# Patient Record
Sex: FEMALE | Race: WHITE
[De-identification: names, ages, dates, MRNs, and addresses within clinical notes are randomized per-mention and may not be internally consistent; named-entity substitution may affect disease eponyms.]

---

## 2017-10-16 ENCOUNTER — HOSPITAL ENCOUNTER (EMERGENCY)
Dept: HOSPITAL 11 - JP.ED | Age: 42
Discharge: HOME | End: 2017-10-16
Payer: MEDICAID

## 2017-10-16 VITALS — SYSTOLIC BLOOD PRESSURE: 138 MMHG | DIASTOLIC BLOOD PRESSURE: 87 MMHG

## 2017-10-16 DIAGNOSIS — F31.9: ICD-10-CM

## 2017-10-16 DIAGNOSIS — Z88.0: ICD-10-CM

## 2017-10-16 DIAGNOSIS — F17.210: ICD-10-CM

## 2017-10-16 DIAGNOSIS — F41.9: Primary | ICD-10-CM

## 2017-10-16 DIAGNOSIS — J45.909: ICD-10-CM

## 2017-10-16 DIAGNOSIS — R21: ICD-10-CM

## 2017-10-16 DIAGNOSIS — Z79.899: ICD-10-CM

## 2017-10-16 NOTE — EDM.PDOC
ED HPI GENERAL MEDICAL PROBLEM





- General


Stated Complaint: MEDICAL VIA NORTH


Time Seen by Provider: 10/16/17 07:10


Source of Information: Reports: Patient, EMS





- History of Present Illness


INITIAL COMMENTS - FREE TEXT/NARRATIVE: 





42-year-old female brought in by ambulance in an acutely anxious state thinking 

she is dehydrated, septic, and several other complaints that are really not 

realistic. She is on several psychiatric medications, her alprazolam and 

Vyvanse both appear to be empty despite being 1 week to 2 weeks before they 

were supposed to be gone. She has some superficial abrasions on her face.


Onset: Unknown/Unsure


Severity: Mild


Associated Symptoms: Reports: Loss of Appetite, Malaise.  Denies: Fever/Chills, 

Nausea/Vomiting, Shortness of Breath


  ** Feet


Pain Score (Numeric/FACES): 6





- Related Data


 Allergies











Allergy/AdvReac Type Severity Reaction Status Date / Time


 


Penicillins Allergy  Hives Verified 10/16/17 07:25











Home Meds: 


 Home Meds





LORazepam 1.5 tab PO TID PRN 06/27/15 [History]


Temazepam 30 mg PO BEDTIME PRN 06/27/15 [History]


tiZANidine [Zanaflex] 4 mg PO QID PRN 08/29/15 [History]


Gabapentin [Neurontin] 1 tab PO TID 10/16/17 [History]


Lisdexamfetamine [Vyvanse] 1 cap PO DAILY 10/16/17 [History]


Lurasidone HCl [Latuda] 20 mg PO DAILY 10/16/17 [History]


Melatonin/Pyridoxine HCl (B6) [Melatonin 3 mg Tablet] 1 tab PO ASDIRECTED 10/16/

17 [History]


Naproxen 1 tab PO BID 10/16/17 [History]


Garcia/Polymyx B Sulf/Dexameth [Ifgekr-Nhhfu-Dyjusrvb Eye Drop] 1 drop EYEBOTH 

ASDIRECTED 10/16/17 [History]


SUMAtriptan [Imitrex] 1 tab PO ASDIRECTED 10/16/17 [History]


Vilazodone [Viibryd] 1 tab PO DAILY 10/16/17 [History]


metroNIDAZOLE [Metronidazole] 1 tab PO BID 10/16/17 [History]











Past Medical History


HEENT History: Reports: Impaired Vision


Respiratory History: Reports: Asthma, Bronchitis, Recurrent, Pneumonia, 

Recurrent


Gastrointestinal History: Reports: Chronic Diarrhea, Irritable Bowel Syndrome


OB/GYN History: Reports: Pregnancy


Other OB/BYN History: states had "internal and external" ultrasound 2 months ago

, found thickening of uterine wall


Musculoskeletal History: Reports: Arthritis, Back Pain, Chronic, Fibromyalgia


Neurological History: Reports: Migraines


Psychiatric History: Reports: Anxiety, Bipolar, Depression, Psych 

Hospitalization(s), Suicide Attempt


Endocrine/Metabolic History: Reports: Obesity/BMI 30+





- Infectious Disease History


Infectious Disease History: Reports: Chicken Pox





- Past Surgical History


Respiratory Surgical History: Reports: Other (See Below)


Female  Surgical History: Reports:  Section, Other (See Below)





Social & Family History





- Tobacco Use


Smoking Status *Q: Light Tobacco Smoker


Years of Tobacco use: 25


Packs/Tins Daily: 0.5


Used Tobacco, but Quit: No


Month Tobacco Last Used: may


Second Hand Smoke Exposure: Yes





- Alcohol Use


Days Per Week of Alcohol Use: 1


Number of Drinks Per Day: 2


Total Drinks Per Week: 2





- Recreational Drug Use


Recreational Drug Use: No


Drug Use in Last 12 Months: Yes


Recreational Drug Type: Reports: Marijuana/Hashish


Recreational Drug Use Frequency: Monthly


Recreational Drug Last Use: 1 week





- Living Situation & Occupation


Living situation: Reports: Single


Occupation: Employed





ED ROS GENERAL





- Review of Systems


Review Of Systems: See Below


Constitutional: Reports: Malaise.  Denies: Fever, Chills


Respiratory: Denies: Shortness of Breath


Cardiovascular: Denies: Chest Pain


GI/Abdominal: Reports: Decreased Appetite, Nausea


: Reports: No Symptoms


Musculoskeletal: Reports: Other (Complains of toe pain)


Skin: Reports: Other (Superficial excoriations and abrasions on her face)





ED EXAM, GENERAL





- Physical Exam


Exam: See Below


Exam Limited By: No Limitations


General Appearance: Alert, Anxious


Eye Exam: Bilateral Eye: Normal Inspection


Respiratory/Chest: No Respiratory Distress, Lungs Clear


Cardiovascular: Regular Rate, Rhythm


GI/Abdominal: Non-Tender


Extremities: Other (Exam of the lower extremities is normal including the feet)


Neurological: Alert


Psychiatric: Anxious


Skin Exam: Warm, Dry, Other (Patient does have several superficial erythematous 

abrasions on the forehead and face)





Course





- Vital Signs


Last Recorded V/S: 


 Last Vital Signs











Temp  98.2 F   10/16/17 07:00


 


Pulse  66   10/16/17 07:30


 


Resp  16   10/16/17 07:30


 


BP  138/87   10/16/17 07:30


 


Pulse Ox  96   10/16/17 07:30














- Orders/Labs/Meds


Labs: 


 Laboratory Tests











  10/16/17 10/16/17 10/16/17 Range/Units





  07:35 07:35 08:04 


 


WBC  8.3    (4.5-11.0)  K/uL


 


RBC  4.23    (3.30-5.50)  M/uL


 


Hgb  12.5    (12.0-15.0)  g/dL


 


Hct  38.2    (36.0-48.0)  %


 


MCV  90    (80-98)  fL


 


MCH  30    (27-31)  pg


 


MCHC  33    (32-36)  %


 


Plt Count  281    (150-400)  K/uL


 


Neut % (Auto)  63    (36-66)  %


 


Lymph % (Auto)  26    (24-44)  %


 


Mono % (Auto)  7 H    (2-6)  %


 


Eos % (Auto)  4    (2-4)  %


 


Baso % (Auto)  0    (0-1)  %


 


Sodium   145   (140-148)  mmol/L


 


Potassium   3.4 L   (3.6-5.2)  mmol/L


 


Chloride   110 H   (100-108)  mmol/L


 


Carbon Dioxide   26   (21-32)  mmol/L


 


Anion Gap   12.4   (5.0-14.0)  mmol/L


 


BUN   11   (7-18)  mg/dL


 


Creatinine   0.7   (0.6-1.0)  mg/dL


 


Est Cr Clr Drug Dosing   97.01   mL/min


 


Estimated GFR (MDRD)   > 60   (>60)  


 


Glucose   94   ()  mg/dL


 


Calcium   8.1 L   (8.5-10.1)  mg/dL


 


Urine Color     


 


Urine Appearance     


 


Urine pH     (4.5-8.0)  


 


Ur Specific Gravity     (1.008-1.030)  


 


Urine Protein     (NEGATIVE)  mg/dL


 


Urine Glucose (UA)     (NEGATIVE)  mg/dL


 


Urine Ketones     (NEGATIVE)  mg/dL


 


Urine Occult Blood     (NEGATIVE)  


 


Urine Nitrite     (NEGATIVE)  


 


Urine Bilirubin     (NEGATIVE)  


 


Urine Urobilinogen     (NORMAL)  mg/dL


 


Ur Leukocyte Esterase     (NEGATIVE)  


 


Urine RBC     (0-5)  


 


Urine WBC     (0-5)  


 


Ur Epithelial Cells     


 


Amorphous Sediment     


 


Urine Bacteria     


 


Urine Mucus     


 


Urine Opiates Screen    Negative  (NEGATIVE)  


 


Ur Oxycodone Screen    Negative  (NEGATIVE)  


 


Urine Methadone Screen    Negative  (NEGATIVE)  


 


Ur Propoxyphene Screen    Negative  (NEGATIVE)  


 


Ur Barbiturates Screen    Negative  (NEGATIVE)  


 


Ur Tricyclics Screen    Negative  (NEGATIVE)  


 


Ur Phencyclidine Scrn    Negative  (NEGATIVE)  


 


Ur Amphetamine Screen    Positive H  (NEGATIVE)  


 


U Methamphetamines Scrn    Negative  (NEGATIVE)  


 


Urine MDMA Screen    Negative  (NEGATIVE)  


 


U Benzodiazepines Scrn    Positive H  (NEGATIVE)  


 


U Cocaine Metab Screen    Negative  (NEGATIVE)  


 


U Marijuana (THC) Screen    Negative  (NEGATIVE)  














  10/16/17 Range/Units





  08:04 


 


WBC   (4.5-11.0)  K/uL


 


RBC   (3.30-5.50)  M/uL


 


Hgb   (12.0-15.0)  g/dL


 


Hct   (36.0-48.0)  %


 


MCV   (80-98)  fL


 


MCH   (27-31)  pg


 


MCHC   (32-36)  %


 


Plt Count   (150-400)  K/uL


 


Neut % (Auto)   (36-66)  %


 


Lymph % (Auto)   (24-44)  %


 


Mono % (Auto)   (2-6)  %


 


Eos % (Auto)   (2-4)  %


 


Baso % (Auto)   (0-1)  %


 


Sodium   (140-148)  mmol/L


 


Potassium   (3.6-5.2)  mmol/L


 


Chloride   (100-108)  mmol/L


 


Carbon Dioxide   (21-32)  mmol/L


 


Anion Gap   (5.0-14.0)  mmol/L


 


BUN   (7-18)  mg/dL


 


Creatinine   (0.6-1.0)  mg/dL


 


Est Cr Clr Drug Dosing   mL/min


 


Estimated GFR (MDRD)   (>60)  


 


Glucose   ()  mg/dL


 


Calcium   (8.5-10.1)  mg/dL


 


Urine Color  Yellow  


 


Urine Appearance  Clear  


 


Urine pH  7.0  (4.5-8.0)  


 


Ur Specific Gravity  1.015  (1.008-1.030)  


 


Urine Protein  Negative  (NEGATIVE)  mg/dL


 


Urine Glucose (UA)  Normal  (NEGATIVE)  mg/dL


 


Urine Ketones  Negative  (NEGATIVE)  mg/dL


 


Urine Occult Blood  Negative  (NEGATIVE)  


 


Urine Nitrite  Negative  (NEGATIVE)  


 


Urine Bilirubin  Negative  (NEGATIVE)  


 


Urine Urobilinogen  Normal  (NORMAL)  mg/dL


 


Ur Leukocyte Esterase  Negative  (NEGATIVE)  


 


Urine RBC  0-5  (0-5)  


 


Urine WBC  0-5  (0-5)  


 


Ur Epithelial Cells  Few  


 


Amorphous Sediment  Not seen  


 


Urine Bacteria  Few  


 


Urine Mucus  Not seen  


 


Urine Opiates Screen   (NEGATIVE)  


 


Ur Oxycodone Screen   (NEGATIVE)  


 


Urine Methadone Screen   (NEGATIVE)  


 


Ur Propoxyphene Screen   (NEGATIVE)  


 


Ur Barbiturates Screen   (NEGATIVE)  


 


Ur Tricyclics Screen   (NEGATIVE)  


 


Ur Phencyclidine Scrn   (NEGATIVE)  


 


Ur Amphetamine Screen   (NEGATIVE)  


 


U Methamphetamines Scrn   (NEGATIVE)  


 


Urine MDMA Screen   (NEGATIVE)  


 


U Benzodiazepines Scrn   (NEGATIVE)  


 


U Cocaine Metab Screen   (NEGATIVE)  


 


U Marijuana (THC) Screen   (NEGATIVE)  














- Re-Assessments/Exams


Free Text/Narrative Re-Assessment/Exam: 





10/16/17 08:46


CBC and CMP were obtained which are generally normal. Urine tox screen was 

positive for amphetamines and benzodiazepines which are prescribed to the 

patient. When I asked her why she was out of her medications early she said 

that her "cat is knocking them over on her table". She needs to schedule an 

appointment in the near future to discuss her medications with her primary care 

physician. I reassured her that she is not significantly physically ill.





Departure





- Departure


Time of Disposition: 09:30


Disposition: Home, Self-Care 01


Condition: Good


Clinical Impression: 


 Anxiety about health, Rash of face








- Discharge Information


Instructions:  Panic Attacks, Easy-to-Read


Referrals: 


PCP,None [Primary Care Provider] - 


Forms:  ED Department Discharge


Care Plan Goals: 


See your primary provider as soon as possible to discuss your medications. 

Continue to stay hydrated.

## 2017-10-19 ENCOUNTER — HOSPITAL ENCOUNTER (EMERGENCY)
Dept: HOSPITAL 11 - JP.ED | Age: 42
Discharge: LEFT BEFORE DISCHARGE | End: 2017-10-19
Payer: MEDICAID

## 2017-10-19 VITALS — SYSTOLIC BLOOD PRESSURE: 135 MMHG | DIASTOLIC BLOOD PRESSURE: 84 MMHG

## 2017-10-19 DIAGNOSIS — T50.905A: ICD-10-CM

## 2017-10-19 DIAGNOSIS — F17.210: ICD-10-CM

## 2017-10-19 DIAGNOSIS — Z88.0: ICD-10-CM

## 2017-10-19 DIAGNOSIS — Z79.899: ICD-10-CM

## 2017-10-19 DIAGNOSIS — E86.0: Primary | ICD-10-CM

## 2017-10-19 PROCEDURE — 96361 HYDRATE IV INFUSION ADD-ON: CPT

## 2017-10-19 PROCEDURE — 85025 COMPLETE CBC W/AUTO DIFF WBC: CPT

## 2017-10-19 PROCEDURE — 99285 EMERGENCY DEPT VISIT HI MDM: CPT

## 2017-10-19 PROCEDURE — 80305 DRUG TEST PRSMV DIR OPT OBS: CPT

## 2017-10-19 PROCEDURE — 81001 URINALYSIS AUTO W/SCOPE: CPT

## 2017-10-19 PROCEDURE — 80053 COMPREHEN METABOLIC PANEL: CPT

## 2017-10-19 PROCEDURE — 71010: CPT

## 2017-10-19 PROCEDURE — 36415 COLL VENOUS BLD VENIPUNCTURE: CPT

## 2017-10-19 PROCEDURE — G0480 DRUG TEST DEF 1-7 CLASSES: HCPCS

## 2017-10-19 PROCEDURE — 96360 HYDRATION IV INFUSION INIT: CPT

## 2017-10-19 NOTE — EDM.PDOCBH
ED HPI GENERAL MEDICAL PROBLEM





- General


Chief Complaint: Drug or Alcohol Abuse


Stated Complaint: DETOXING/DEHYDRATED


Time Seen by Provider: 10/19/17 15:25


Source of Information: Reports: Patient


History Limitations: Reports: No Limitations





- History of Present Illness


INITIAL COMMENTS - FREE TEXT/NARRATIVE: 





pt is detoxing off some of her meds. She was at [facility] last nite but was 

not comfortable there. 


Onset: Gradual, Other ( Pt has been dealing with problems for a number of days. 

)


Duration: Day(s):


Location: Reports: Generalized


Severity: Moderate


Associated Symptoms: Reports: Loss of Appetite, Shortness of Breath, Weakness


  ** Generalized


Pain Score (Numeric/FACES): 5





- Related Data


 Allergies











Allergy/AdvReac Type Severity Reaction Status Date / Time


 


Penicillins Allergy  Hives Verified 10/20/17 03:35











Home Meds: 


 Home Meds





FLUoxetine [PROzac] 20 mg PO DAILY  cap 10/21/17 [Rx]


Gabapentin [Neurontin] 300 mg PO BID  cap 10/21/17 [Rx]


Nicotine [Habitrol] 7 mg TRDERM DAILY  patch 10/21/17 [Rx]


QUEtiapine [SEROquel] 50 mg PO TID PRN  tablet 10/21/17 [Rx]


QUEtiapine [SEROquel] 150 mg PO BEDTIME  tablet 10/21/17 [Rx]


QUEtiapine [SEROquel] 150 mg PO BEDTIME  tablet 10/21/17 [Rx]











Past Medical History


HEENT History: Reports: Impaired Vision


Respiratory History: Reports: Asthma, Bronchitis, Recurrent, Pneumonia, 

Recurrent


Gastrointestinal History: Reports: Chronic Diarrhea, Irritable Bowel Syndrome


OB/GYN History: Reports: Pregnancy


Other OB/BYN History: states had "internal and external" ultrasound 2 months ago

, found thickening of uterine wall


Musculoskeletal History: Reports: Arthritis, Back Pain, Chronic, Fibromyalgia


Neurological History: Reports: Migraines


Psychiatric History: Reports: Anxiety, Bipolar, Depression, Psych 

Hospitalization(s), Suicide Attempt


Endocrine/Metabolic History: Reports: Obesity/BMI 30+





- Infectious Disease History


Infectious Disease History: Reports: Chicken Pox





- Past Surgical History


Respiratory Surgical History: Reports: Other (See Below)


Female  Surgical History: Reports:  Section, Other (See Below)





Social & Family History





- Tobacco Use


Smoking Status *Q: Current Every Day Smoker


Years of Tobacco use: 30


Packs/Tins Daily: 0.5


Used Tobacco, but Quit: No


Month Tobacco Last Used: may


Second Hand Smoke Exposure: Yes





- Caffeine Use


Caffeine Use: Reports: Coffee





- Alcohol Use


Days Per Week of Alcohol Use: 1


Number of Drinks Per Day: 2


Total Drinks Per Week: 2





- Recreational Drug Use


Recreational Drug Use: No


Drug Use in Last 12 Months: Yes


Recreational Drug Type: Reports: Marijuana/Hashish


Recreational Drug Use Frequency: Monthly


Recreational Drug Last Use: 1 week





- Living Situation & Occupation


Living situation: Reports: Single


Occupation: Employed





ED ROS GENERAL





- Review of Systems


Review Of Systems: See Below


Constitutional: Reports: No Symptoms


HEENT: Reports: No Symptoms


Respiratory: Reports: No Symptoms


Cardiovascular: Reports: No Symptoms


Endocrine: Reports: No Symptoms


GI/Abdominal: Reports: No Symptoms


: Reports: No Symptoms


Musculoskeletal: Reports: No Symptoms


Skin: Reports: No Symptoms


Neurological: Reports: Other (pt states she is detoxing from drugs.  She has 

taken 1200 mg of gabapentin today and she coulkdn, remember what else. )





ED EXAM, BEHAVIORAL HEALTH





- Physical Exam


Exam: See Below


Text/Narrative:: 





pt is here very agitated and is  stating that she is detoxing from drugs She 

admits to over using  her vyvance during the time she was helping her mother. 


Exam Limited By: No Limitations


General Appearance: Alert, Anxious, Other (ptis very agitated.  pupils are 

equal nd reactive. )


Ears: Normal TMs


Nose: Normal Inspection


Throat/Mouth: Normal Inspection


Head: Atraumatic


Neck: Normal Inspection


Respiratory/Chest: No Respiratory Distress


Cardiovascular: Regular Rate, Rhythm


GI/Abdominal: Soft, Non-Tender


 (Female) Exam: Deferred


Rectal (Female) Exam: Deferred


Back Exam: Normal Inspection


Extremities: Normal Inspection


Neurological: Alert, Normal Cognition


Psychiatric: Alert, Normal Affect, Normal Cognition





COURSE, BEHAVIORAL HEALTH COMP





- Course


Vital Signs: 


 Last Vital Signs











Temp  37.1 C   10/19/17 15:00


 


Pulse  80   10/19/17 15:00


 


Resp  18   10/19/17 15:00


 


BP  135/84   10/19/17 15:00


 


Pulse Ox  97   10/19/17 15:00











Orders, Labs, Meds: 


 Laboratory Tests











  10/19/17 10/19/17 10/19/17 Range/Units





  15:37 15:37 15:37 


 


WBC  9.6    (4.5-11.0)  K/uL


 


RBC  4.79    (3.30-5.50)  M/uL


 


Hgb  14.4    (12.0-15.0)  g/dL


 


Hct  42.7    (36.0-48.0)  %


 


MCV  89    (80-98)  fL


 


MCH  30    (27-31)  pg


 


MCHC  34    (32-36)  %


 


Plt Count  322    (150-400)  K/uL


 


Neut % (Auto)  66    (36-66)  %


 


Lymph % (Auto)  23 L    (24-44)  %


 


Mono % (Auto)  7 H    (2-6)  %


 


Eos % (Auto)  2    (2-4)  %


 


Baso % (Auto)  1    (0-1)  %


 


Sodium   141   (140-148)  mmol/L


 


Potassium   3.9   (3.6-5.2)  mmol/L


 


Chloride   108   (100-108)  mmol/L


 


Carbon Dioxide   25   (21-32)  mmol/L


 


Anion Gap   8.2   (5.0-14.0)  mmol/L


 


BUN   8   (7-18)  mg/dL


 


Creatinine   0.8   (0.6-1.0)  mg/dL


 


Est Cr Clr Drug Dosing   84.88   mL/min


 


Estimated GFR (MDRD)   > 60   (>60)  


 


Glucose   90   ()  mg/dL


 


Calcium   8.6   (8.5-10.1)  mg/dL


 


Total Bilirubin   0.6  D   (0.2-1.0)  mg/dL


 


AST   17   (15-37)  U/L


 


ALT   31   (12-78)  U/L


 


Alkaline Phosphatase   49   ()  U/L


 


Total Protein   6.6   (6.4-8.2)  g/dL


 


Albumin   3.6   (3.4-5.0)  g/dL


 


Globulin   3.0   (2.3-3.5)  g/dL


 


Albumin/Globulin Ratio   1.2   (1.2-2.2)  


 


Urine Color     


 


Urine Appearance     


 


Urine pH     (4.5-8.0)  


 


Ur Specific Gravity     (1.008-1.030)  


 


Urine Protein     (NEGATIVE)  mg/dL


 


Urine Glucose (UA)     (NEGATIVE)  mg/dL


 


Urine Ketones     (NEGATIVE)  mg/dL


 


Urine Occult Blood     (NEGATIVE)  


 


Urine Nitrite     (NEGATIVE)  


 


Urine Bilirubin     (NEGATIVE)  


 


Urine Urobilinogen     (NORMAL)  mg/dL


 


Ur Leukocyte Esterase     (NEGATIVE)  


 


Urine RBC     (0-5)  


 


Urine WBC     (0-5)  


 


Ur Epithelial Cells     


 


Amorphous Sediment     


 


Urine Bacteria     


 


Urine Mucus     


 


Urine Opiates Screen     (NEGATIVE)  


 


Ur Oxycodone Screen     (NEGATIVE)  


 


Urine Methadone Screen     (NEGATIVE)  


 


Ur Propoxyphene Screen     (NEGATIVE)  


 


Ur Barbiturates Screen     (NEGATIVE)  


 


Ur Tricyclics Screen     (NEGATIVE)  


 


Ur Phencyclidine Scrn     (NEGATIVE)  


 


Ur Amphetamine Screen     (NEGATIVE)  


 


U Methamphetamines Scrn     (NEGATIVE)  


 


Urine MDMA Screen     (NEGATIVE)  


 


U Benzodiazepines Scrn     (NEGATIVE)  


 


U Cocaine Metab Screen     (NEGATIVE)  


 


U Marijuana (THC) Screen     (NEGATIVE)  


 


Ethyl Alcohol    < 3  mg/dL














  10/19/17 10/19/17 Range/Units





  15:58 15:58 


 


WBC    (4.5-11.0)  K/uL


 


RBC    (3.30-5.50)  M/uL


 


Hgb    (12.0-15.0)  g/dL


 


Hct    (36.0-48.0)  %


 


MCV    (80-98)  fL


 


MCH    (27-31)  pg


 


MCHC    (32-36)  %


 


Plt Count    (150-400)  K/uL


 


Neut % (Auto)    (36-66)  %


 


Lymph % (Auto)    (24-44)  %


 


Mono % (Auto)    (2-6)  %


 


Eos % (Auto)    (2-4)  %


 


Baso % (Auto)    (0-1)  %


 


Sodium    (140-148)  mmol/L


 


Potassium    (3.6-5.2)  mmol/L


 


Chloride    (100-108)  mmol/L


 


Carbon Dioxide    (21-32)  mmol/L


 


Anion Gap    (5.0-14.0)  mmol/L


 


BUN    (7-18)  mg/dL


 


Creatinine    (0.6-1.0)  mg/dL


 


Est Cr Clr Drug Dosing    mL/min


 


Estimated GFR (MDRD)    (>60)  


 


Glucose    ()  mg/dL


 


Calcium    (8.5-10.1)  mg/dL


 


Total Bilirubin    (0.2-1.0)  mg/dL


 


AST    (15-37)  U/L


 


ALT    (12-78)  U/L


 


Alkaline Phosphatase    ()  U/L


 


Total Protein    (6.4-8.2)  g/dL


 


Albumin    (3.4-5.0)  g/dL


 


Globulin    (2.3-3.5)  g/dL


 


Albumin/Globulin Ratio    (1.2-2.2)  


 


Urine Color   Yellow  


 


Urine Appearance   Clear  


 


Urine pH   8.0  (4.5-8.0)  


 


Ur Specific Gravity   1.015  (1.008-1.030)  


 


Urine Protein   Negative  (NEGATIVE)  mg/dL


 


Urine Glucose (UA)   Normal  (NEGATIVE)  mg/dL


 


Urine Ketones   Negative  (NEGATIVE)  mg/dL


 


Urine Occult Blood   Negative  (NEGATIVE)  


 


Urine Nitrite   Negative  (NEGATIVE)  


 


Urine Bilirubin   Negative  (NEGATIVE)  


 


Urine Urobilinogen   Normal  (NORMAL)  mg/dL


 


Ur Leukocyte Esterase   Negative  (NEGATIVE)  


 


Urine RBC   Not seen  (0-5)  


 


Urine WBC   0-5  (0-5)  


 


Ur Epithelial Cells   Few  


 


Amorphous Sediment   Not seen  


 


Urine Bacteria   Few  


 


Urine Mucus   Not seen  


 


Urine Opiates Screen  Negative   (NEGATIVE)  


 


Ur Oxycodone Screen  Negative   (NEGATIVE)  


 


Urine Methadone Screen  Negative   (NEGATIVE)  


 


Ur Propoxyphene Screen  Negative   (NEGATIVE)  


 


Ur Barbiturates Screen  Negative   (NEGATIVE)  


 


Ur Tricyclics Screen  Negative   (NEGATIVE)  


 


Ur Phencyclidine Scrn  Negative   (NEGATIVE)  


 


Ur Amphetamine Screen  Negative   (NEGATIVE)  


 


U Methamphetamines Scrn  Negative   (NEGATIVE)  


 


Urine MDMA Screen  Negative   (NEGATIVE)  


 


U Benzodiazepines Scrn  Positive H   (NEGATIVE)  


 


U Cocaine Metab Screen  Negative   (NEGATIVE)  


 


U Marijuana (THC) Screen  Negative   (NEGATIVE)  


 


Ethyl Alcohol    mg/dL








Medications














Discontinued Medications














Generic Name Dose Route Start Last Admin





  Trade Name Freq  PRN Reason Stop Dose Admin


 


Diphenhydramine HCl  50 mg  10/19/17 17:08  10/19/17 17:30





  Benadryl  PO  10/19/17 17:09  50 mg





  ONETIME ONE   Administration


 


Diphenhydramine HCl  Confirm  10/19/17 17:33  





  Benadryl  Administered  10/19/17 17:34  





  Dose   





  25 mg   





  .ROUTE   





  .STK-MED ONE   


 


Sodium Chloride  1,000 mls @ 400 mls/hr  10/19/17 15:45  10/19/17 16:21





  Normal Saline  IV   400 mls/hr





  ASDIRECTED BRITTANY   Administration


 


Sodium Chloride  1,000 mls @ 999 mls/hr  10/19/17 16:45  





  Normal Saline  IV   





  ASDIRECTED BRITTANY   











Medical Clearance: 





10/19/17 16:39


lab work looks good. Her drug screen is positive for benzodiapines. 





Departure





- Departure


Time of Disposition: 15:00


Disposition: [facility] 07


Condition: Fair


Clinical Impression: 


 Adverse drug interaction, Dehydration








- Discharge Information


Referrals: 


PCP,None [Primary Care Provider] - 


Forms:  ED Department Discharge


Care Plan Goals: 


pt aric

## 2017-10-20 ENCOUNTER — HOSPITAL ENCOUNTER (OUTPATIENT)
Dept: HOSPITAL 11 - JP.ED | Age: 42
Setting detail: OBSERVATION
LOS: 1 days | Discharge: HOME | End: 2017-10-21
Attending: INTERNAL MEDICINE | Admitting: INTERNAL MEDICINE
Payer: MEDICAID

## 2017-10-20 DIAGNOSIS — Z79.899: ICD-10-CM

## 2017-10-20 DIAGNOSIS — Z98.890: ICD-10-CM

## 2017-10-20 DIAGNOSIS — G47.00: ICD-10-CM

## 2017-10-20 DIAGNOSIS — Z88.0: ICD-10-CM

## 2017-10-20 DIAGNOSIS — F41.9: ICD-10-CM

## 2017-10-20 DIAGNOSIS — M79.7: ICD-10-CM

## 2017-10-20 DIAGNOSIS — F17.210: ICD-10-CM

## 2017-10-20 DIAGNOSIS — F31.9: Primary | ICD-10-CM

## 2017-10-20 DIAGNOSIS — E66.9: ICD-10-CM

## 2017-10-20 DIAGNOSIS — J45.909: ICD-10-CM

## 2017-10-20 PROCEDURE — 99285 EMERGENCY DEPT VISIT HI MDM: CPT

## 2017-10-20 PROCEDURE — G0378 HOSPITAL OBSERVATION PER HR: HCPCS

## 2017-10-20 NOTE — EDM.PDOC
ED HPI GENERAL MEDICAL PROBLEM





- General


Chief Complaint: Allergic Reaction


Stated Complaint: MEDICAL VIA NORTH


Time Seen by Provider: 10/20/17 03:34


Source of Information: Reports: Patient


History Limitations: Reports: No Limitations





- History of Present Illness


INITIAL COMMENTS - FREE TEXT/NARRATIVE: 





42 year old female brought in by ambulance with concerns of possible allergic 

reaction. Had been seen earlier last PM for symptoms related to benzodiazapine 

use and was recommended to be admitted for detox and control of her symptoms. 

labwork and chest x-ray had been basically normal. Prior to last week was 

taking ativan 3 times daily with temazapam 30 mg at bedtime. She ran out of 

meds one week ago, and had withdrawal symptoms . She was in detox this week but 

left and did agree to treatment, she felt that they were not attending her 

needs. [place]ite with increased anxiety, feeling short of breath with skin 

crawling sensation, nausea and no vomiting. she denies rash, wheezing, 

difficulty swallowing


Onset Date: 10/18/17


Duration: Intermittent, Waxing/Waning


Location: Reports: Generalized


Improves with: Reports: None


Associated Symptoms: Reports: Diaphoresis, Headaches, Loss of Appetite, Malaise

, Shortness of Breath, Weakness


Treatments PTA: Reports: Other (see below) (was in detox until 2 days ago)


  ** toes bilateral feet


Pain Score (Numeric/FACES): 8





- Related Data


 Allergies











Allergy/AdvReac Type Severity Reaction Status Date / Time


 


Penicillins Allergy  Hives Verified 10/20/17 03:35











Home Meds: 


 Home Meds





LORazepam 1.5 tab PO TID PRN 06/27/15 [History]


Temazepam 30 mg PO BEDTIME PRN 06/27/15 [History]


tiZANidine [Zanaflex] 4 mg PO QID PRN 08/29/15 [History]


Gabapentin [Neurontin] 1 tab PO TID 10/16/17 [History]


Lisdexamfetamine [Vyvanse] 1 cap PO DAILY 10/16/17 [History]


Lurasidone HCl [Latuda] 20 mg PO DAILY 10/16/17 [History]


Melatonin/Pyridoxine HCl (B6) [Melatonin 3 mg Tablet] 1 tab PO ASDIRECTED 10/16/

17 [History]


Naproxen 1 tab PO BID 10/16/17 [History]


SUMAtriptan [Imitrex] 1 tab PO ASDIRECTED 10/16/17 [History]


Vilazodone [Viibryd] 1 tab PO DAILY 10/16/17 [History]











Past Medical History


HEENT History: Reports: Impaired Vision


Respiratory History: Reports: Asthma, Bronchitis, Recurrent, Pneumonia, 

Recurrent


Gastrointestinal History: Reports: Chronic Diarrhea, Irritable Bowel Syndrome


OB/GYN History: Reports: Pregnancy


Other OB/BYN History: states had "internal and external" ultrasound 2 months ago

, found thickening of uterine wall


Musculoskeletal History: Reports: Arthritis, Back Pain, Chronic, Fibromyalgia


Neurological History: Reports: Migraines


Psychiatric History: Reports: Anxiety, Bipolar, Depression, Psych 

Hospitalization(s), Suicide Attempt


Endocrine/Metabolic History: Reports: Obesity/BMI 30+





- Infectious Disease History


Infectious Disease History: Reports: Chicken Pox





- Past Surgical History


Respiratory Surgical History: Reports: Other (See Below)


Female  Surgical History: Reports:  Section, Other (See Below)





Social & Family History





- Tobacco Use


Smoking Status *Q: Current Every Day Smoker


Years of Tobacco use: 30


Packs/Tins Daily: 0.5


Used Tobacco, but Quit: No


Month Tobacco Last Used: may


Second Hand Smoke Exposure: Yes





- Caffeine Use


Caffeine Use: Reports: Coffee, Soda





- Alcohol Use


Days Per Week of Alcohol Use: 1


Number of Drinks Per Day: 2


Total Drinks Per Week: 2





- Recreational Drug Use


Recreational Drug Use: Yes


Drug Use in Last 12 Months: Yes


Recreational Drug Type: Reports: Marijuana/Hashish


Recreational Drug Use Frequency: Rarely


Recreational Drug Last Use: 1 week





- Living Situation & Occupation


Living situation: Reports: Single


Occupation: Employed





ED ROS ALLERGIC REACTION





- Review of Systems


Review Of Systems: See Below


Constitutional: Reports: Malaise, Weakness, Diaphoresis, Decreased Appetite


HEENT: Reports: No Symptoms


Respiratory: Reports: Shortness of Breath.  Denies: Cough, Sputum


Cardiovascular: Reports: Lightheadedness.  Denies: Chest Pain, Dyspnea on 

Exertion


GI/Abdominal: Reports: Anorexia, Decreased Appetite, Nausea.  Denies: Vomiting


: Reports: No Symptoms


Musculoskeletal: Reports: Muscle Pain


Neurological: Reports: Dizziness, Headache, Tingling, Weakness


Psychiatric: Reports: Anxiety, Confusion, Mood Lability


Immunologic: Reports: No Symptoms





ED EXAM GENERAL NO PERIP PULSE





- Physical Exam


Exam: See Below


Exam Limited By: No Limitations


General Appearance: Alert, Mild Distress


Ears: Normal External Exam


Nose: Normal Inspection


Throat/Mouth: Normal Inspection, Normal Lips, Normal Teeth, Normal Oropharynx, 

Normal Voice


Head: Atraumatic, Normocephalic


Neck: Normal Inspection, Supple, Non-Tender, Full Range of Motion


Respiratory/Chest: No Respiratory Distress, Lungs Clear, Normal Breath Sounds


Cardiovascular: Normal Peripheral Pulses, Regular Rate, Rhythm, No Edema


GI/Abdominal: Normal Bowel Sounds, Soft


Neurological: Alert, Oriented


Psychiatric: Anxious, Tearful


Skin Exam: Warm


Lymphatic: No Adenopathy





Course





- Vital Signs


Last Recorded V/S: 





 Last Vital Signs











Temp  37.1 C   10/20/17 03:35


 


Pulse  82   10/20/17 03:35


 


Resp  18   10/20/17 03:35


 


BP  111/72   10/20/17 03:35


 


Pulse Ox  98   10/20/17 03:35














Departure





- Departure


Time of Disposition: 04:17


Disposition: Refer to Observation


Clinical Impression: 


 Withdrawal from benzodiazepine








- Discharge Information


Referrals: 


PCP,None [Primary Care Provider] - 





- Assessment/Plan


Assessment:: 





42 year old female who returns to ED after leaving yesterday PM, AMA before 

being admitted. She is experiencing symptoms of benzodiazapine withdrawal with 

mild confusion, anxiety, skin sensation, nausea. At this point she is willing 

to be admitted, her case was discussed with Dr. Holden Cheema who will accept 

her for admission

## 2017-10-20 NOTE — CR
Heart size within normal limits. Right lung is clear. Faint density left upper lobe. Radiographic fol
low-up. Difficult to exclude developing pneumonitis.

## 2017-10-20 NOTE — PCM.HP
H&P History of Present Illness





- General


Date of Service: 10/20/17


Admit Problem/Dx: 


 Admission Diagnosis/Problem





Admission Diagnosis/Problem      Drug withdrawal








Source of Information: Patient


History Limitations: Reports: No Limitations





- History of Present Illness


Initial Comments - Free Text/Narative: 





Brenda is a 42 year old female who recently stopped all her drugs including Latuda

, previously on Seroquel, also stopped vyvanse, viibryd, Temazepam, Lorazepam.  

She has been having anxiety and sri.  She has a history of bipolar disorder, 

depression, anxiety and insomnia.  She has stopped all medicine 1 week ago 

except viibryd and gabapentin.  


Onset of Symptoms: Reports: Sudden


  ** toes bilateral feet


Pain Score (Numeric/FACES): 8





- Related Data


Allergies/Adverse Reactions: 


 Allergies











Allergy/AdvReac Type Severity Reaction Status Date / Time


 


Penicillins Allergy  Hives Verified 10/20/17 03:35











Home Medications: 


 Home Meds





LORazepam 1.5 tab PO TID PRN 06/27/15 [History]


Temazepam 30 mg PO BEDTIME PRN 06/27/15 [History]


tiZANidine [Zanaflex] 0.25 mg PO BEDTIME PRN 08/29/15 [History]


Gabapentin [Neurontin] 400 mg PO TID 10/16/17 [History]


Naproxen 500 mg PO BID 10/16/17 [History]


Vilazodone [Viibryd] 40 mg PO BEDTIME 10/16/17 [History]











Past Medical History


HEENT History: Reports: Impaired Vision


Respiratory History: Reports: Asthma, Bronchitis, Recurrent, Pneumonia, 

Recurrent


Gastrointestinal History: Reports: Chronic Diarrhea, Irritable Bowel Syndrome


OB/GYN History: Reports: Pregnancy


Other OB/BYN History: states had "internal and external" ultrasound 2 months ago

, found thickening of uterine wall


Musculoskeletal History: Reports: Arthritis, Back Pain, Chronic, Fibromyalgia


Neurological History: Reports: Migraines


Psychiatric History: Reports: Anxiety, Bipolar, Depression, Psych 

Hospitalization(s), Suicide Attempt


Endocrine/Metabolic History: Reports: Obesity/BMI 30+





- Infectious Disease History


Infectious Disease History: Reports: Chicken Pox





- Past Surgical History


Female  Surgical History: Reports:  Section





Social & Family History





- Tobacco Use


Smoking Status *Q: Current Every Day Smoker


Years of Tobacco use: 27


Packs/Tins Daily: 0.5


Used Tobacco, but Quit: No


Month Tobacco Last Used: may


Second Hand Smoke Exposure: No





- Caffeine Use


Caffeine Use: Reports: Coffee, Soda, Tea


Other Caffeine Use: minimal coffee, soda, and tea





- Alcohol Use


Days Per Week of Alcohol Use: 1


Number of Drinks Per Day: 2


Total Drinks Per Week: 2





- Recreational Drug Use


Recreational Drug Use: Yes


Drug Use in Last 12 Months: Yes


Recreational Drug Type: Reports: Marijuana/Hashish


Recreational Drug Use Frequency: Weekly


Recreational Drug Last Use: 1 week





- Living Situation & Occupation


Living situation: Reports: Single


Occupation: Employed





H&P Review of Systems





- Review of Systems:


Review Of Systems: See Below


General: Reports: Fever, Weakness, Fatigue, Night Sweats


HEENT: Reports: Dysphasia, Ear Pain, Eye Pain, Headaches, Hearing Changes, 

Vertigo, Visual Changes


Pulmonary: Reports: Shortness of Breath, Cough


Cardiovascular: Reports: Chest Pain, Palpitations, Dyspnea on Exertion


Genitourinary: Reports: No Symptoms


Musculoskeletal: Reports: Joint Pain, Muscle Pain


Skin: Reports: No Symptoms


Psychiatric: Reports: Confusion, Mood Lability, Anxiety, Agitation


Neurological: Reports: Headache, Weakness


Hematologic/Lymphatic: Reports: Easy Bruising





Exam





- Exam


Exam: See Below





- Vital Signs


Vital Signs: 


 Last Vital Signs











Temp  100.5 F   10/20/17 07:00


 


Pulse  98   10/20/17 07:00


 


Resp  18   10/20/17 07:00


 


BP  111/71   10/20/17 07:00


 


Pulse Ox  98   10/20/17 07:00











Weight: 184 lb





- Exam


General: Alert, Oriented, Cooperative, Moderate Distress


HEENT: PERRLA, EACs Clear, Hearing Intact


Neck: Supple, Full Range of Motion


Lungs: Clear to Auscultation, Normal Respiratory Effort


Cardiovascular: Regular Rate, Regular Rhythm


GI/Abdominal Exam: Normal Bowel Sounds, Soft


Extremities: Normal Inspection


Peripheral Pulses: 1+: Radial (L), Radial (R)


Skin: Warm, Dry, Intact


Neurological: Cranial Nerves Intact, Reflexes Equal Bilateral, Strength Equal 

Bilateral


Neuro Extensive - Mental Status: Alert, Oriented x3, Normal Cognition, Memory 

Intact


Neuro Extensive - Motor, Sensory, Reflexes: CN II-XII Intact


DTR: 1+: Bicep (L), Bicep (R)


Psychiatric: Alert, Labile Mood, Anxious





*Q Meaningful Use (ADM)





- VTE *Q


VTE Criteria *Q: 








- Stroke *Q


Stroke Criteria *Q: 








- AMI *Q


AMI Criteria *Q: 





Problem List Initiated/Reviewed/Updated: Yes


Orders Last 24hrs: 


 Active Orders 24 hr











 Category Date Time Status


 


 Patient Status [ADT] Routine ADT  10/20/17 08:00 Ordered


 


 Intake and Output [RC] QSHIFT Care  10/20/17 08:02 Ordered


 


 Oxygen Therapy [RC] PRN Care  10/20/17 08:00 Ordered


 


 VTE/DVT Education [RC] Per Unit Routine Care  10/20/17 08:00 Ordered


 


 Vital Signs [RC] Q4H Care  10/20/17 08:00 Ordered


 


 Regular Diet [DIET] Diet  10/20/17 Breakfast Active


 


 FLUoxetine [PROzac] Med  10/20/17 08:15 Ordered





 20 mg PO DAILY   


 


 Pneumococcal Polyvalent-23 Vac [Pneumovax 23] Med  10/20/17 12:00 Once





 0.5 ml IM .ONCE ONE   


 


 QUEtiapine [SEROquel] Med  10/20/17 21:00 Ordered





 150 mg PO BEDTIME   


 


 QUEtiapine [SEROquel] Med  10/20/17 08:04 Ordered





 50 mg PO TID PRN   


 


 Quetiapine [Seroquel] Med  10/20/17 21:00 Active





 150 mg PO BEDTIME   


 


 Resuscitation Status Routine Resus Stat  10/20/17 07:46 Ordered








 Medication Orders





Pneumococcal Polyvalent Vaccine (Pneumovax 23)  0.5 ml IM .ONCE ONE


   Stop: 10/20/17 12:01


Quetiapine Fumarate (Seroquel)  150 mg PO BEDTIME BRITTANY


Quetiapine Fumarate (Seroquel)  50 mg PO TID PRN


   PRN Reason: Anxiety


Quetiapine Fumarate 100 mg/ (Quetiapine Fumarate 50 mg)  150 mg PO BEDTIME BRITTANY








Assessment/Plan Comment:: 





Assessment/Plan:  #1.  Bipolar disorder:  Danish put her baack on Seroquel 150mg 

at h.s. and 50 mg as needed during the dday. 





#2.  Depression:  Stable on Viibryd will start Prozac instead.





#3.  Insomnia:  will treat this with Seroquel not Temazepam. 





#4.  Anxiety:  Meds as above.





#5.  Fibramyalgia and DDD.  Controlled with Gabapentin and will continue.

## 2017-10-21 VITALS — DIASTOLIC BLOOD PRESSURE: 52 MMHG | SYSTOLIC BLOOD PRESSURE: 98 MMHG

## 2017-10-21 NOTE — PCM.PN
- General Info


Date of Service: 10/21/17


Admission Dx/Problem (Free Text): 





Anxiety came in by ambulance as was unable to control her emotions.  She was 

admitted from the ER.


Functional Status: Reports: Pain Controlled





- Review of Systems


General: Reports: No Symptoms


HEENT: Reports: No Symptoms


Pulmonary: Reports: No Symptoms


Cardiovascular: Reports: No Symptoms


Gastrointestinal: Reports: No Symptoms


Genitourinary: Reports: No Symptoms


Musculoskeletal: Reports: Back Pain


Skin: Reports: No Symptoms


Neurological: Reports: No Symptoms


Psychiatric: Reports: Anxiety





- Patient Data


Vitals - Most Recent: 


 Last Vital Signs











Temp  100.5 F   10/21/17 02:00


 


Pulse  101 H  10/21/17 02:00


 


Resp  16   10/21/17 02:00


 


BP  114/73   10/21/17 02:00


 


Pulse Ox  94 L  10/21/17 02:00











Weight - Most Recent: 184 lb


I&O - Last 24 Hours: 


 Intake & Output











 10/20/17 10/20/17 10/21/17





 14:59 22:59 06:59


 


Intake Total  1440 600


 


Balance  1440 600











Med Orders - Current: 


 Current Medications





Fluoxetine HCl (Prozac)  20 mg PO DAILY [facility]


   Last Admin: 10/20/17 09:02 Dose:  20 mg


Gabapentin (Neurontin)  300 mg PO TID [facility]


   Last Admin: 10/20/17 21:03 Dose:  300 mg


Nicotine (Habitrol)  7 mg TRDERM DAILY [facility]


   Last Admin: 10/20/17 09:06 Dose:  7 mg


Quetiapine Fumarate (Seroquel)  50 mg PO TID PRN


   PRN Reason: Anxiety


   Last Admin: 10/20/17 15:08 Dose:  50 mg


Quetiapine Fumarate 100 mg/ (Quetiapine Fumarate 50 mg)  150 mg PO BEDTIME [facility]


   Last Admin: 10/20/17 21:02 Dose:  150 mg





Discontinued Medications





Diazepam (Valium.)  10 mg PO ONETIME ONE


   Stop: 10/20/17 04:18


   Last Admin: 10/20/17 04:33 Dose:  10 mg


Pneumococcal Polyvalent Vaccine (Pneumovax 23)  0.5 ml IM .ONCE ONE


   Stop: 10/20/17 12:01


Quetiapine Fumarate (Seroquel)  150 mg PO BEDTIME [facility]











- Exam


General: Alert, Oriented


HEENT: Pupils Equal, Pupils Reactive, EOMI, Mucous Membr. Moist/Pink


Neck: Supple


Lungs: Clear to Auscultation, Normal Respiratory Effort


Cardiovascular: Regular Rate, Regular Rhythm


GI/Abdominal Exam: Normal Bowel Sounds, Soft, Non-Tender, No Organomegaly, No 

Distention, No Abnormal Bruit, No Mass, Pelvis Stable


Back Exam: Vertebral Tenderness


Extremities: Normal Inspection, Normal Range of Motion, Non-Tender, No Pedal 

Edema, Normal Capillary Refill


Peripheral Pulses: 1+: Radial (L), Radial (R)


Skin: Warm, Dry, Intact


Neurological: No New Focal Deficit


Psy/Mental Status: Anxious





- Problem List Review


Problem List Initiated/Reviewed/Updated: Yes





- My Orders


Last 24 Hours: 


My Active Orders





10/20/17 07:46


Resuscitation Status Routine 





10/20/17 08:00


Patient Status [ADT] Routine 


Oxygen Therapy [RC] PRN 


VTE/DVT Education [RC] Per Unit Routine 


Vital Signs [RC] Q4H 





10/20/17 08:02


Intake and Output [RC] Q12H 





10/20/17 08:04


QUEtiapine [SEROquel]   50 mg PO TID PRN 





10/20/17 08:39


Consult to  [CONS] Routine 





10/20/17 09:00


FLUoxetine [PROzac]   20 mg PO DAILY 


Gabapentin [Neurontin]   300 mg PO TID 


Nicotine [Habitrol]   7 mg TRDERM DAILY 





10/20/17 21:00


Quetiapine [Seroquel]   150 mg PO BEDTIME 





10/20/17 Breakfast


Regular Diet [DIET] 














- Plan


Plan:: 





Assessment/Plan:  #1.  Bipolar disorder:  Will put her back on Seroquel 150mg 

at h.s. and 50 mg as needed during the day. 





#2.  Depression:  Stable on Viibryd will start Prozac instead.





#3.  Insomnia:  will treat this with Seroquel not Temazepam. 





#4.  Anxiety:  Meds as above.





#5.  Fibramyalgia and DDD.  Controlled with Gabapentin and will continue but 

with a smaller dose and taper this off over time.  





Plan home today..

## 2017-10-21 NOTE — PCM.DCSUM1
**Discharge Summary





- Hospital Course


HPI Initial Comments: 





She came in from home having a hard time controlling her emotions.  She had 

been on anti-anxiety meds and was in the ER last night and went home AMA.  She 

wants to get off her addicting medications.  She was recently stopped her 

Seroquel and has not been doing well since. She was admitted as an observation 

patient.





- Discharge Data


Discharge Date: 10/21/17


Discharge Disposition: Home, Self-Care 01


Condition: Fair





- Patient Summary/Data


Consults: 


 Consultations





10/20/17 08:39


Consult to  [CONS] Routine 


   Comment: 


   Physician Instructions: 


   Reason for Consult: home living situation











Hospital Course: 





Her medicine was adjusted and she begin to feel better.  She housing problem 

was resolved as she is moving back with her mother who just lost her  to 

cancer. She will be taking Seroquel 159mg at night and 50 mg tid as needed.  

She will also taped off Gabapentin over time..





- Patient Instructions


Diet: Heart Healthy Diet


Activity: As Tolerated





- Discharge Plan


Home Medications: 


 Home Meds





FLUoxetine [PROzac] 20 mg PO DAILY  cap 10/21/17 [Rx]


Gabapentin [Neurontin] 300 mg PO BID  cap 10/21/17 [Rx]


Nicotine [Habitrol] 7 mg TRDERM DAILY  patch 10/21/17 [Rx]


QUEtiapine [SEROquel] 50 mg PO TID PRN  tablet 10/21/17 [Rx]








Forms:  ED Department Discharge


Referrals: 


PCP,None [Ordering Only Provider] - 





- General Info


Date of Service: 10/21/17


Functional Status: Reports: Pain Controlled





- Review of Systems


General: Reports: Weakness


HEENT: Reports: No Symptoms


Pulmonary: Reports: No Symptoms


Cardiovascular: Reports: No Symptoms


Gastrointestinal: Reports: No Symptoms


Genitourinary: Reports: No Symptoms


Musculoskeletal: Reports: No Symptoms


Skin: Reports: No Symptoms


Neurological: Reports: No Symptoms


Psychiatric: Reports: Anxiety





- Patient Data


Vitals - Most Recent: 


 Last Vital Signs











Temp  100.5 F   10/21/17 02:00


 


Pulse  101 H  10/21/17 02:00


 


Resp  16   10/21/17 02:00


 


BP  114/73   10/21/17 02:00


 


Pulse Ox  94 L  10/21/17 02:00











Weight - Most Recent: 184 lb


I&O - Last 24 hours: 


 Intake & Output











 10/20/17 10/20/17 10/21/17





 14:59 22:59 06:59


 


Intake Total  1440 600


 


Balance  1440 600











Med Orders - Current: 


 Current Medications





Fluoxetine HCl (Prozac)  [facility]ILY [facility]


   Last Admin: 10/20/17 09:02 Dose:  20 mg


Gabapentin (Neurontin)  300 mg PO TID [facility]


   Last Admin: 10/20/17 21:03 Dose:  300 mg


Nicotine (Habitrol)  7 mg TRDERM DAILY [facility]


   Last Admin: 10/20/17 09:06 Dose:  7 mg


Quetiapine Fumarate (Seroquel)  50 mg PO TID PRN


   PRN Reason: Anxiety


   Last Admin: 10/20/17 15:08 Dose:  50 mg


Quetiapine Fumarate 100 mg/ (Quetiapine Fumarate 50 mg)  150 mg PO BEDTIME [facility]


   Last Admin: 10/20/17 21:02 Dose:  150 mg





Discontinued Medications





Diazepam (Valium.)  10 mg PO ONETIME ONE


   Stop: 10/20/17 04:18


   Last Admin: 10/20/17 04:33 Dose:  10 mg


Pneumococcal Polyvalent Vaccine (Pneumovax 23)  0.5 ml IM .ONCE ONE


   Stop: 10/20/17 12:01


Quetiapine Fumarate (Seroquel)  150 mg PO BEDTIME BRITTANY











- Exam


General: Reports: Alert, Oriented


HEENT: Reports: Pupils Equal, Pupils Reactive, EOMI, Mucous Membr. Moist/Pink


Neck: Reports: Supple


Lungs: Reports: Clear to Auscultation, Normal Respiratory Effort


Cardiovascular: Reports: Regular Rate, Regular Rhythm


GI/Abdominal Exam: Normal Bowel Sounds, Soft, Non-Tender, No Organomegaly, No 

Distention, No Abnormal Bruit, No Mass, Pelvis Stable


Back Exam: Reports: Vertebral Tenderness


Skin: Reports: Warm


Psy/Mental Status: Reports: Anxious





*Q Meaningful Use (DIS)





- VTE *Q


VTE Criteria *Q: 








- Stroke *Q


Stroke Criteria *Q: 








- AMI *Q


AMI Criteria *Q:

## 2017-10-22 ENCOUNTER — HOSPITAL ENCOUNTER (EMERGENCY)
Dept: HOSPITAL 11 - JP.ED | Age: 42
Discharge: HOME | End: 2017-10-22
Payer: MEDICAID

## 2017-10-22 VITALS — DIASTOLIC BLOOD PRESSURE: 74 MMHG | SYSTOLIC BLOOD PRESSURE: 125 MMHG

## 2017-10-22 DIAGNOSIS — F41.9: Primary | ICD-10-CM

## 2017-10-22 DIAGNOSIS — Z88.0: ICD-10-CM

## 2017-10-22 DIAGNOSIS — Z79.899: ICD-10-CM

## 2017-10-22 DIAGNOSIS — F31.9: ICD-10-CM

## 2017-10-22 DIAGNOSIS — J45.909: ICD-10-CM

## 2017-10-22 PROCEDURE — 99284 EMERGENCY DEPT VISIT MOD MDM: CPT

## 2017-10-22 NOTE — EDM.PDOC
ED HPI GENERAL MEDICAL PROBLEM





- General


Chief Complaint: Behavioral/Psych


Stated Complaint: MEDICAL VIA NORTH


Time Seen by Provider: 10/22/17 01:11


Source of Information: Reports: Patient, EMS Notes Reviewed, RN


History Limitations: Reports: No Limitations





- History of Present Illness


INITIAL COMMENTS - FREE TEXT/NARRATIVE: 





anxiety; this is a 42 year old female present to ER via EMS for concerns of not 

feeling well. she was discharge from the Hospital this morning for medication 

adjustment. she has taken Gabapentin 300mg 4 times today, and Seroquel 100mg at 

bedtime. 





She lives alone at the [facility]. Doesn't want to be alone in her 

apartment, wants to be at her Mother's home. 





Today she has been eating and drinking, she has been eating "junk" foods such 

as potato chips, tator tots and other foods. 





-denies any fever,chills, nausea, vomiting, dysuria, cough or diarrhea. 


-she has been taking her temperature on and off all day with her highest 

temperature at 99.8


Onset: Today


Duration: Constant


Location: Reports: Generalized


Improves with: Reports: None


Worsens with: Reports: None


Context: Reports: Other (medication adjustment)


Associated Symptoms: Reports: No Other Symptoms


  ** bilateral feet


Pain Score (Numeric/FACES): 8





  ** chest pain


Pain Score (Numeric/FACES): 3





- Related Data


 Allergies











Allergy/AdvReac Type Severity Reaction Status Date / Time


 


Penicillins Allergy  Hives Verified 10/22/17 00:59











Home Meds: 


 Home Meds





FLUoxetine [PROzac] 20 mg PO DAILY  cap 10/21/17 [Rx]


Gabapentin [Neurontin] 300 mg PO BID  cap 10/21/17 [Rx]


Nicotine [Habitrol] 7 mg TRDERM DAILY  patch 10/21/17 [Rx]


QUEtiapine [SEROquel] 50 mg PO TID PRN  tablet 10/21/17 [Rx]











Past Medical History


HEENT History: Reports: Impaired Vision


Respiratory History: Reports: Asthma, Bronchitis, Recurrent, Pneumonia, 

Recurrent


Gastrointestinal History: Reports: Chronic Diarrhea, Irritable Bowel Syndrome


OB/GYN History: Reports: Pregnancy


Other OB/BYN History: states had "internal and external" ultrasound 2 months ago

, found thickening of uterine wall


Musculoskeletal History: Reports: Arthritis, Back Pain, Chronic, Fibromyalgia


Neurological History: Reports: Migraines


Psychiatric History: Reports: Anxiety, Bipolar, Depression, Psych 

Hospitalization(s), Suicide Attempt


Endocrine/Metabolic History: Reports: Obesity/BMI 30+





- Infectious Disease History


Infectious Disease History: Reports: Chicken Pox





- Past Surgical History


Respiratory Surgical History: Reports: Other (See Below)


Female  Surgical History: Reports:  Section





Social & Family History





- Tobacco Use


Smoking Status *Q: Current Every Day Smoker


Years of Tobacco use: 30


Packs/Tins Daily: 0.5


Used Tobacco, but Quit: No


Month Tobacco Last Used: may


Second Hand Smoke Exposure: No





- Caffeine Use


Caffeine Use: Reports: Coffee, Soda, Tea


Other Caffeine Use: minimal coffee, soda, and tea





- Alcohol Use


Days Per Week of Alcohol Use: 1


Number of Drinks Per Day: 2


Total Drinks Per Week: 2





- Recreational Drug Use


Recreational Drug Use: No


Drug Use in Last 12 Months: Yes


Recreational Drug Type: Reports: Marijuana/Hashish


Recreational Drug Use Frequency: Weekly


Recreational Drug Last Use: 1 week





- Living Situation & Occupation


Living situation: Reports: Single


Occupation: Employed





ED ROS GENERAL





- Review of Systems


Review Of Systems: See Below


Constitutional: Reports: Other (not feeling well, anxiety.)


HEENT: Reports: No Symptoms


Respiratory: Reports: No Symptoms


Cardiovascular: Reports: No Symptoms


Endocrine: Reports: No Symptoms


GI/Abdominal: Reports: Constipation


: Reports: No Symptoms


Musculoskeletal: Reports: No Symptoms


Skin: Reports: No Symptoms


Neurological: Reports: No Symptoms


Psychiatric: Reports: Anxiety


Hematologic/Lymphatic: Reports: No Symptoms


Immunologic: Reports: No Symptoms





ED EXAM, GENERAL





- Physical Exam


Exam: See Below


Exam Limited By: No Limitations


General Appearance: Alert, WD/WN, No Apparent Distress


Eye Exam: Bilateral Eye: Normal Inspection


Ears: Normal External Exam, Normal Canal, Hearing Grossly Normal, Normal TMs


Nose: Normal Inspection, Normal Mucosa


Throat/Mouth: Normal Inspection, Normal Lips, Normal Teeth, Normal Gums, Normal 

Oropharynx, Normal Voice, No Airway Compromise


Head: Atraumatic, Normocephalic


Neck: Normal Inspection, Supple, Non-Tender, Full Range of Motion


Respiratory/Chest: No Respiratory Distress, Lungs Clear, Normal Breath Sounds, 

No Accessory Muscle Use, Chest Non-Tender


Cardiovascular: Normal Peripheral Pulses, Regular Rate, Rhythm, No Edema, No 

Murmur


Peripheral Pulses: 2+: Radial (L), Radial (R), Dorsalis Pedis (L), Dorsalis 

Pedis (R)


GI/Abdominal: Normal Bowel Sounds, Soft, Non-Tender, No Organomegaly


 (Female) Exam: Deferred


Rectal (Female) Exam: Deferred


Back Exam: Normal Inspection, Full Range of Motion


Extremities: Normal Inspection, Normal Range of Motion, Non-Tender, No Pedal 

Edema, Normal Capillary Refill


Neurological: Alert, Oriented, Normal Cognition, No Motor/Sensory Deficits


Psychiatric: Normal Affect, Normal Mood, Flat Affect


Skin Exam: Warm, Dry, Intact, Normal Color, No Rash


Lymphatic: No Adenopathy





Course





- Vital Signs


Last Recorded V/S: 


 Last Vital Signs











Temp  36.5 C   10/22/17 01:01


 


Pulse  90   10/22/17 01:01


 


Resp  16   10/22/17 01:01


 


BP  125/74   10/22/17 01:01


 


Pulse Ox  99   10/22/17 01:01














- Orders/Labs/Meds


Meds: 


Medications














Discontinued Medications














Generic Name Dose Route Start Last Admin





  Trade Name Freq  PRN Reason Stop Dose Admin


 


Diazepam  10 mg  10/22/17 01:32  10/22/17 01:38





  Valium.  PO  10/22/17 01:33  10 mg





  ONETIME ONE   Administration














- Re-Assessments/Exams


Free Text/Narrative Re-Assessment/Exam: 





will give Valium 10 mg po


advise to follow up with Primary Care Provider on Monday or early next week


advise to take medication as prescribed by her Provider, needs to take as 

directed


drink plenty of fluids, eat healthy, needs walk and get exercise





Ms. Rodgers agrees with plan of care.














Departure





- Departure


Time of Disposition: 01:45


Disposition: Home, Self-Care 01


Condition: Good


Clinical Impression: 


 Anxiety








- Discharge Information


Instructions:  Panic Attacks, Easy-to-Read


Referrals: 


PCP,None [Primary Care Provider] - 


Forms:  ED Department Discharge


Care Plan Goals: 


Anxiety


-given Valium in ER


-advised to take medication as prescribed by her Doctor


-follow up with Primary Care Provider on Monday for recheck





return to Clinic, Urgent Care or ER if not improved or symptoms worsen.





- Problem List & Annotations


(1) Anxiety


SNOMED Code(s): 62338764


   Code(s): F41.9 - ANXIETY DISORDER, UNSPECIFIED   Status: Chronic   Priority: 

Low   





- Problem List Review


Problem List Initiated/Reviewed/Updated: Yes





- Assessment/Plan


Plan: 





Anxiety


-given Valium in ER


-advised to take medication as prescribed by her Doctor


-follow up with Primary Care Provider on Monday for recheck





return to Clinic, Urgent Care or ER if not improved or symptoms worsen.

## 2017-12-16 ENCOUNTER — HOSPITAL ENCOUNTER (EMERGENCY)
Dept: HOSPITAL 11 - JP.ED | Age: 42
Discharge: HOME | End: 2017-12-16
Payer: MEDICAID

## 2017-12-16 VITALS — DIASTOLIC BLOOD PRESSURE: 64 MMHG | SYSTOLIC BLOOD PRESSURE: 141 MMHG

## 2017-12-16 DIAGNOSIS — K02.9: ICD-10-CM

## 2017-12-16 DIAGNOSIS — K04.7: Primary | ICD-10-CM

## 2017-12-16 DIAGNOSIS — Z88.0: ICD-10-CM

## 2017-12-16 DIAGNOSIS — F17.210: ICD-10-CM

## 2017-12-16 DIAGNOSIS — Z79.899: ICD-10-CM

## 2017-12-16 NOTE — EDM.PDOC
ED HPI GENERAL MEDICAL PROBLEM





- General


Chief Complaint: ENT Problem


Stated Complaint: TOOTH PAIN


Time Seen by Provider: 17 08:48


Source of Information: Reports: Patient, RN Notes Reviewed





- History of Present Illness


INITIAL COMMENTS - FREE TEXT/NARRATIVE: 





Pt has infected  and painful wisdom teeth on the lower level . She has taken 3 

courses of antibiotic  therapy.  She has a yeast infection.


Onset: Gradual


Duration: Day(s):


Location: Reports: Face


Quality: Reports: Pressure


Associated Symptoms: Reports: Other (pain in the area of the wisdom teeth, lower

)


  ** pain in spine


Pain Score (Numeric/FACES): 7





- Related Data


 Allergies











Allergy/AdvReac Type Severity Reaction Status Date / Time


 


Penicillins Allergy  Hives Verified 10/22/17 00:59











Home Meds: 


 Home Meds





FLUoxetine [PROzac] 20 mg PO DAILY  cap 10/21/17 [Rx]


Gabapentin [Neurontin] 300 mg PO BID  cap 10/21/17 [Rx]


Naproxen 375 mg PO 17 [History]


QUEtiapine [SEROquel] 100 mg PO DAILY 17 [History]


tiZANidine [Zanaflex] 4 mg BID 17 [History]











Past Medical History


HEENT History: Reports: Impaired Vision


Respiratory History: Reports: Asthma, Bronchitis, Recurrent, Pneumonia, 

Recurrent


Gastrointestinal History: Reports: Chronic Diarrhea, Irritable Bowel Syndrome


OB/GYN History: Reports: Pregnancy


Other OB/BYN History: states had "internal and external" ultrasound 2 months ago

, found thickening of uterine wall


Musculoskeletal History: Reports: Arthritis, Back Pain, Chronic, Fibromyalgia


Neurological History: Reports: Migraines


Psychiatric History: Reports: Anxiety, Bipolar, Depression, Psych 

Hospitalization(s), Suicide Attempt


Endocrine/Metabolic History: Reports: Obesity/BMI 30+





- Infectious Disease History


Infectious Disease History: Reports: Chicken Pox





- Past Surgical History


Female  Surgical History: Reports:  Section





Social & Family History





- Tobacco Use


Smoking Status *Q: Current Every Day Smoker


Years of Tobacco use: 30


Packs/Tins Daily: 0.5


Used Tobacco, but Quit: No


Month Tobacco Last Used: may


Second Hand Smoke Exposure: No





- Caffeine Use


Caffeine Use: Reports: Coffee, Soda, Tea


Other Caffeine Use: minimal coffee, soda, and tea





- Alcohol Use


Days Per Week of Alcohol Use: 1


Number of Drinks Per Day: 2


Total Drinks Per Week: 2





- Recreational Drug Use


Recreational Drug Use: No


Drug Use in Last 12 Months: Yes


Recreational Drug Type: Reports: Marijuana/Hashish


Recreational Drug Use Frequency: Weekly


Recreational Drug Last Use: 1 week





- Living Situation & Occupation


Living situation: Reports: Single


Occupation: Employed





ED ROS ENT





- Review of Systems


Review Of Systems: See Below


Constitutional: Reports: No Symptoms


HEENT: Reports: Dental Pain


Respiratory: Reports: No Symptoms


Cardiovascular: Reports: No Symptoms


Endocrine: Reports: No Symptoms


GI/Abdominal: Reports: No Symptoms


: Reports: No Symptoms


Musculoskeletal: Reports: No Symptoms


Skin: Reports: No Symptoms





ED EXAM, ENT





- Physical Exam


Exam: See Below


Text/Narrative:: 





pt arrived with pain in the dental area--both lower wisdom teeth. She is also 

complaining of a yeast infection from the antibiotic therapy. 


Exam Limited By: No Limitations


General Appearance: Alert, Anxious, Mild Distress


Eye Exam: Right Eye: Other


Ears: Normal TMs


Nose: Normal Inspection


Mouth/Throat: Other ( both lower wisdom teeth are swollen and carious. They are 

tender to palpate. She does not have alot of swollen gums. )


Head: Atraumatic


Neck: Normal Inspection


Respiratory/Chest: No Respiratory Distress


Cardiovascular: Regular Rate, Rhythm


GI/Abdominal: Soft, Non-Tender





Course





- Orders/Labs/Meds


Orders: 





 Active Orders 24 hr











 Category Date Time Status


 


 CBC WITH AUTO DIFF [HEME] Urgent Lab  17 08:47 Ordered


 


 COMPREHENSIVE METABOLIC PN,CMP [CHEM] Urgent Lab  17 08:47 Ordered


 


 CRP [C-REACTIVE PROTEIN] [CHEM] Stat Lab  17 08:47 Ordered


 


 UA W/MICROSCOPIC [URIN] Urgent Lab  17 08:47 Uncollected














- Re-Assessments/Exams


Free Text/Narrative Re-Assessment/Exam: 





17 09:25


pt arrived with pain in the wisdom teeth area on the lower gum line. Her wbc is 

normal.  


17 09:25





17 09:26


chemistries are normal. Her urine looks clear. Her hydration is good. Her 

specfic gravity on the urine is 1.010





Departure





- Departure


Time of Disposition: 09:27


Disposition: Home, Self-Care 01


Condition: Fair


Clinical Impression: 


 Tooth infection








- Discharge Information


Referrals: 


Holden Cheema Sr, MD [Primary Care Provider] - 


Care Plan Goals: 


irrigate mouth frequently with warm water, kefex 500mg tid for 10 days, stop 

flagyl, difluran 50mg now and repeat every 5 days for the yeast infection, keep 

appt with oral surgery. 





- My Orders


Last 24 Hours: 





My Active Orders





17 08:47


CBC WITH AUTO DIFF [HEME] Urgent 


COMPREHENSIVE METABOLIC PN,CMP [CHEM] Urgent 


CRP [C-REACTIVE PROTEIN] [CHEM] Stat 


UA W/MICROSCOPIC [URIN] Urgent 














- Assessment/Plan


Last 24 Hours: 





My Active Orders





17 08:47


CBC WITH AUTO DIFF [HEME] Urgent 


COMPREHENSIVE METABOLIC PN,CMP [CHEM] Urgent 


CRP [C-REACTIVE PROTEIN] [CHEM] Stat 


UA W/MICROSCOPIC [URIN] Urgent

## 2018-02-11 ENCOUNTER — HOSPITAL ENCOUNTER (EMERGENCY)
Dept: HOSPITAL 11 - JP.ED | Age: 43
Discharge: HOME | End: 2018-02-11
Payer: MEDICAID

## 2018-02-11 VITALS — SYSTOLIC BLOOD PRESSURE: 138 MMHG | DIASTOLIC BLOOD PRESSURE: 64 MMHG

## 2018-02-11 DIAGNOSIS — F31.9: ICD-10-CM

## 2018-02-11 DIAGNOSIS — J40: ICD-10-CM

## 2018-02-11 DIAGNOSIS — Z87.01: ICD-10-CM

## 2018-02-11 DIAGNOSIS — Z79.899: ICD-10-CM

## 2018-02-11 DIAGNOSIS — F17.210: ICD-10-CM

## 2018-02-11 DIAGNOSIS — Z88.0: ICD-10-CM

## 2018-02-11 DIAGNOSIS — Z98.890: ICD-10-CM

## 2018-02-11 DIAGNOSIS — K08.89: Primary | ICD-10-CM

## 2018-02-11 NOTE — EDM.PDOC
ED HPI GENERAL MEDICAL PROBLEM





- General


Chief Complaint: ENT Problem


Stated Complaint: INFECTED TOOTH AREA


Time Seen by Provider: 18 08:45


Source of Information: Reports: Patient


History Limitations: Reports: No Limitations





- History of Present Illness


INITIAL COMMENTS - FREE TEXT/NARRATIVE: 





pt had her wisdom teeth extracted 6.5 weeks ago. She is now having jaw pain 

worse on the left than the rt.  She is also coughing up alot of yellowish 

mucous from her chest. She does clinch her teeth some so she has some 

tenderness over the tmj area. 


Onset: Gradual


Duration: Hour(s):


Location: Reports: Face, Other ( Pt has tenderness over the jaw area. )


Associated Symptoms: Reports: No Other Symptoms


  ** Oral/Mouth


Pain Score (Numeric/FACES): 2





- Related Data


 Allergies











Allergy/AdvReac Type Severity Reaction Status Date / Time


 


Penicillins Allergy  Hives Verified 18 08:41











Home Meds: 


 Home Meds





FLUoxetine [PROzac] 20 mg PO DAILY  cap 10/21/17 [Rx]


Gabapentin [Neurontin] 300 mg PO BID  cap 10/21/17 [Rx]


Naproxen 375 mg PO ASDIRECTED PRN 17 [History]


QUEtiapine [SEROquel] 100 mg PO DAILY 17 [History]


tiZANidine [Zanaflex] 4 mg PO BID 17 [History]











Past Medical History


HEENT History: Reports: Impaired Vision


Respiratory History: Reports: Asthma, Bronchitis, Recurrent, Pneumonia, 

Recurrent


Gastrointestinal History: Reports: Chronic Diarrhea, Irritable Bowel Syndrome


OB/GYN History: Reports: Pregnancy


Other OB/BYN History: states had "internal and external" ultrasound 2 months ago

, found thickening of uterine wall


Musculoskeletal History: Reports: Arthritis, Back Pain, Chronic, Fibromyalgia


Neurological History: Reports: Migraines


Psychiatric History: Reports: Anxiety, Bipolar, Depression, Psych 

Hospitalization(s), Suicide Attempt


Endocrine/Metabolic History: Reports: Obesity/BMI 30+





- Infectious Disease History


Infectious Disease History: Reports: Chicken Pox





- Past Surgical History


HEENT Surgical History: Reports: Oral Surgery


Female  Surgical History: Reports:  Section





Social & Family History





- Tobacco Use


Smoking Status *Q: Current Every Day Smoker


Years of Tobacco use: 30


Packs/Tins Daily: 0.5


Used Tobacco, but Quit: No


Month Tobacco Last Used: may


Second Hand Smoke Exposure: No





- Caffeine Use


Caffeine Use: Reports: Coffee


Other Caffeine Use: minimal coffee, soda, and tea





- Alcohol Use


Days Per Week of Alcohol Use: 1


Number of Drinks Per Day: 2


Total Drinks Per Week: 2





- Recreational Drug Use


Recreational Drug Use: Yes


Drug Use in Last 12 Months: Yes


Recreational Drug Type: Reports: Marijuana/Hashish


Recreational Drug Use Frequency: Weekly


Recreational Drug Last Use: 1 week





- Living Situation & Occupation


Living situation: Reports: Single


Occupation: Employed





ED ROS ENT





- Review of Systems


Review Of Systems: See Below


Constitutional: Reports: No Symptoms


HEENT: Reports: Other (pain in the post jaw around the extracton site for her 

wisdom teeth. )


Respiratory: Reports: Cough, Sputum


Cardiovascular: Reports: No Symptoms


Endocrine: Reports: No Symptoms


GI/Abdominal: Reports: No Symptoms


: Reports: No Symptoms


Musculoskeletal: Reports: No Symptoms


Skin: Reports: No Symptoms


Neurological: Reports: No Symptoms





ED EXAM, ENT





- Physical Exam


Exam: See Below


Text/Narrative:: 





Pt is coughing up yellow sputum and alot of it. She has pain in the extraction 

sites particularly on the left. 


Exam Limited By: No Limitations


General Appearance: Alert, Mild Distress


Ears: Normal TMs


Nose: Normal Inspection


Mouth/Throat: Other ( Pt is quite tender in the jaw right by where the wisdom 

teeth were extracted.  This is much worse on the left. )


Head: Atraumatic


Neck: Lymphadenopathy (R), Lymphadenopathy (L)


Respiratory/Chest: No Respiratory Distress, Other (Pt has a few rhonchi in the 

upper lung fields. )


Cardiovascular: Regular Rate, Rhythm





Course





- Vital Signs


Last Recorded V/S: 


 Last Vital Signs











Temp  36.9 C   18 08:37


 


Pulse  76   18 08:37


 


Resp  18   18 08:37


 


BP  138/64   18 08:37


 


Pulse Ox  95   18 08:37














Departure





- Departure


Time of Disposition: 09:52


Disposition: Home, Self-Care 01


Condition: Fair


Clinical Impression: 


 Pain, dental, Bronchitis








- Discharge Information


Instructions:  Acute Bronchitis, Adult


Referrals: 


Holden Cheema Sr, MD [Primary Care Provider] - 


Forms:  ED Department Discharge


Care Plan Goals: 


 clindomycin 300mg tid, diflucan 100mg , use yogurt or probiotic while on 

cllindomycin

## 2018-02-25 ENCOUNTER — HOSPITAL ENCOUNTER (EMERGENCY)
Dept: HOSPITAL 11 - JP.ED | Age: 43
Discharge: HOME | End: 2018-02-25
Payer: MEDICAID

## 2018-02-25 VITALS — SYSTOLIC BLOOD PRESSURE: 133 MMHG | DIASTOLIC BLOOD PRESSURE: 87 MMHG

## 2018-02-25 DIAGNOSIS — F31.9: ICD-10-CM

## 2018-02-25 DIAGNOSIS — J45.909: ICD-10-CM

## 2018-02-25 DIAGNOSIS — F17.210: ICD-10-CM

## 2018-02-25 DIAGNOSIS — Z20.6: Primary | ICD-10-CM

## 2018-02-25 DIAGNOSIS — Z79.899: ICD-10-CM

## 2018-02-25 DIAGNOSIS — Z87.01: ICD-10-CM

## 2018-02-25 DIAGNOSIS — Z88.0: ICD-10-CM

## 2018-02-25 NOTE — EDM.PDOC
ED HPI GENERAL MEDICAL PROBLEM





- General


Chief Complaint: General


Stated Complaint: BLOOD TEST


Time Seen by Provider: 18 14:16


Source of Information: Reports: Patient


History Limitations: Reports: No Limitations





- History of Present Illness


INITIAL COMMENTS - FREE TEXT/NARRATIVE: 





This patient said that she is here to get checked for HIV. She said that she 

had sex with a man who might have HIV initially 2 weeks ago and she said at 

that time he didn't appear to ejaculate. She then had sex with him again 3 

nights ago. Someone told her that this individual had been in a relationship 

with a woman who had HIV and  sometime over the past year. The patient has 

not contacted the her ex-partner about her concerns about HIV apparently they'

re not on speaking terms anymore although this individual lives in the same 

apartment building as the patient. She is unable to contact him but apparently 

not willing.





- Related Data


 Allergies











Allergy/AdvReac Type Severity Reaction Status Date / Time


 


Penicillins Allergy  Hives Verified 18 14:17











Home Meds: 


 Home Meds





FLUoxetine [PROzac] 20 mg PO DAILY  cap 10/21/17 [Rx]


Gabapentin [Neurontin] 300 mg PO BID  cap 10/21/17 [Rx]


Naproxen 375 mg PO ASDIRECTED PRN 17 [History]


QUEtiapine [SEROquel] 100 mg PO DAILY 17 [History]


tiZANidine [Zanaflex] 4 mg PO BID 17 [History]











Past Medical History


HEENT History: Reports: Impaired Vision


Respiratory History: Reports: Asthma, Bronchitis, Recurrent, Pneumonia, 

Recurrent


Gastrointestinal History: Reports: Chronic Diarrhea, Irritable Bowel Syndrome


OB/GYN History: Reports: Pregnancy


Other OB/BYN History: states had "internal and external" ultrasound 2 months ago

, found thickening of uterine wall


Musculoskeletal History: Reports: Arthritis, Back Pain, Chronic, Fibromyalgia


Neurological History: Reports: Migraines


Psychiatric History: Reports: Anxiety, Bipolar, Depression, Psych 

Hospitalization(s), Suicide Attempt


Endocrine/Metabolic History: Reports: Obesity/BMI 30+





- Infectious Disease History


Infectious Disease History: Reports: Chicken Pox





- Past Surgical History


HEENT Surgical History: Reports: Oral Surgery


Female  Surgical History: Reports:  Section





Social & Family History





- Tobacco Use


Smoking Status *Q: Current Every Day Smoker


Years of Tobacco use: 30


Packs/Tins Daily: 0.5


Used Tobacco, but Quit: No


Month Tobacco Last Used: may


Second Hand Smoke Exposure: No





- Caffeine Use


Caffeine Use: Reports: Coffee


Other Caffeine Use: minimal coffee, soda, and tea





- Alcohol Use


Days Per Week of Alcohol Use: 1


Number of Drinks Per Day: 2


Total Drinks Per Week: 2





- Recreational Drug Use


Recreational Drug Use: Yes


Drug Use in Last 12 Months: Yes


Recreational Drug Type: Reports: Marijuana/Hashish


Recreational Drug Use Frequency: Weekly


Recreational Drug Last Use: 1 week





- Living Situation & Occupation


Living situation: Reports: Single


Occupation: Employed





ED ROS GENERAL





- Review of Systems


Review Of Systems: ROS reveals no pertinent complaints other than HPI.





ED EXAM, GENERAL





- Physical Exam


Exam: See Below


Exam Limited By: No Limitations


General Appearance: Alert, WD/WN, No Apparent Distress


Throat/Mouth: Normal Oropharynx


Respiratory/Chest: Lungs Clear


Cardiovascular: Regular Rate, Rhythm


 (Female) Exam: Other (Pelvic exam was not done on this patient since it was 

not indicated)


Skin Exam: Warm, Dry





Course





- Vital Signs


Last Recorded V/S: 


 Last Vital Signs











Temp  37.8 C   18 14:16


 


Pulse  72   18 14:16


 


Resp  20   18 14:16


 


BP  133/87   18 14:16


 


Pulse Ox  91 L  18 14:16














- Orders/Labs/Meds


Orders: 


 Active Orders 24 hr











 Category Date Time Status


 


 CHLAMYDIA,AND GC BY APTIMA Urgent Lab  18 14:59 Ordered


 


 HEPATITIS PANEL,ACUTE [REF] Stat Lab  18 15:22 Received


 


 RPR-TREP PALLIDIUM AB,REFLEX [REF] Stat Lab  18 15:22 Received











Labs: 


 Laboratory Tests











  18 Range/Units





  15:22 15:22 15:22 


 


WBC   12.0 H   (4.5-11.0)  K/uL


 


RBC   4.98   (3.30-5.50)  M/uL


 


Hgb   15.2 H   (12.0-15.0)  g/dL


 


Hct   45.2   (36.0-48.0)  %


 


MCV   91   (80-98)  fL


 


MCH   31   (27-31)  pg


 


MCHC   34   (32-36)  %


 


Plt Count   338   (150-400)  K/uL


 


Neut % (Auto)   73 H   (36-66)  %


 


Lymph % (Auto)   19 L   (24-44)  %


 


Mono % (Auto)   6   (2-6)  %


 


Eos % (Auto)   2   (2-4)  %


 


Baso % (Auto)   0   (0-1)  %


 


Sodium    141  (140-148)  mmol/L


 


Potassium    4.2  (3.6-5.2)  mmol/L


 


Chloride    106  (100-108)  mmol/L


 


Carbon Dioxide    26  (21-32)  mmol/L


 


Anion Gap    8.9  (5.0-14.0)  mmol/L


 


BUN    9  (7-18)  mg/dL


 


Creatinine    1.0  (0.6-1.0)  mg/dL


 


Est Cr Clr Drug Dosing    67.91  mL/min


 


Estimated GFR (MDRD)    > 60  (>60)  


 


Glucose    86  ()  mg/dL


 


Calcium    9.1  (8.5-10.1)  mg/dL


 


Total Bilirubin    0.7  D  (0.2-1.0)  mg/dL


 


AST    27  (15-37)  U/L


 


ALT    25  (12-78)  U/L


 


Alkaline Phosphatase    74  ()  U/L


 


Total Protein    7.5  (6.4-8.2)  g/dL


 


Albumin    4.1  (3.4-5.0)  g/dL


 


Globulin    3.4  (2.3-3.5)  g/dL


 


Albumin/Globulin Ratio    1.2  (1.2-2.2)  


 


HIV-1 Ab Rapid Screen  Non-reactive    (NON-REACT.)  














- Re-Assessments/Exams


Free Text/Narrative Re-Assessment/Exam: 





18 19:07


I explained to the patient that we would be testing her for HIV hepatitis C 

hepatitis B as well as GC chlamydia and syphilis. I called around to [facility] 

which I believe was the only pharmacy open at the time and they don't have any 

of the drugs that are recommended for HIV post exposure prophylaxis by the CDC. 

I told the patient that the only place to get these prescriptions filled right 

now would be either in [place] or the [facility]. The patient said that she's 

has no way she could go and  any prescriptions. We could order those 

prescriptions but they would not be here for somewhere between 2 and 3 days. 

Wear right at 72 hours now from her last possible exposure. I also told her 

that the testing needs to be done now if it's negative repeated in 4-6 weeks 

and 3 months and in 6 months and she eventually decided that she didn't want to 

do any of this. I insisted that the best thing to do right now is for her to 

contact her X partner and find out if he has HIV or would consider getting 

tested. I asked her to do that here in the emergency department and she just 

finally decided she didn't want to do any of this and just wanted to leave and 

follow-up with her family doctor. Considering that she had intercourse 4 weeks 

ago and again 3 nights ago as well as the low transmission rate from a single 

episode of unprotected intercourse I think it's reasonable to try to get this 

information from her ex-partner rather than to start her on this difficult post 

exposure prophylaxis which we really can't obtain in a timely manner.





Departure





- Departure


Time of Disposition: 15:34


Disposition: Home, Self-Care 01


Condition: Fair


Clinical Impression: 


 HIV exposure








- Discharge Information


Instructions:  Sexually Transmitted Disease, Easy-to-Read, HIV Infection and 

AIDS


Referrals: 


Holden Cheema Sr, MD [Primary Care Provider] - 


Forms:  ED Department Discharge


Additional Instructions: 


It would be best if you can find out if you're former partner actually has HIV. 

If you have been exposed to HIV then you need to get on medications within 72 

hours. These medications or not available here they were if there ordered they'

ll take 2 or 3 days to come in. If you were to be put on these medications it 

would be best to get them in either [place] or [place] where she would need 

to call first.





If you're former partner test negative for HIV then there would be no reason to 

take those medications. Incidentally the medications would need to be taken for 

28 days area





If you or certain you were exposed to HIV then the HIV test need to be done now 

then repeated in 4-6 weeks again in 3 months and then finally again at 6 

months. You can avoid all of this if you can determine for certain that you're 

former partner does not have HIV.





I also ordered tests for hepatitis it's a blood test as well as gonorrhea and 

chlamydia that would be a urine test and syphilis which is a blood test if you 

haven't submitted a urine specimen in the gonorrhea and Chlamydia test will be 

done. It is standard procedure to check for these things when you check for HIV





- My Orders


Last 24 Hours: 


My Active Orders





18 14:59


CHLAMYDIA,AND GC BY APTIMA Urgent 





18 15:22


HEPATITIS PANEL,ACUTE [REF] Stat 


RPR-TREP PALLIDIUM AB,REFLEX [REF] Stat 














- Assessment/Plan


Last 24 Hours: 


My Active Orders





18 14:59


CHLAMYDIA,AND GC BY APTIMA Urgent 





18 15:22


HEPATITIS PANEL,ACUTE [REF] Stat 


RPR-TREP PALLIDIUM AB,REFLEX [REF] Stat

## 2019-03-17 ENCOUNTER — HOSPITAL ENCOUNTER (EMERGENCY)
Dept: HOSPITAL 11 - JP.ED | Age: 44
Discharge: HOME | End: 2019-03-17
Payer: MEDICAID

## 2019-03-17 VITALS — SYSTOLIC BLOOD PRESSURE: 136 MMHG | DIASTOLIC BLOOD PRESSURE: 76 MMHG

## 2019-03-17 DIAGNOSIS — F17.210: ICD-10-CM

## 2019-03-17 DIAGNOSIS — K08.89: Primary | ICD-10-CM

## 2019-03-17 DIAGNOSIS — Z88.0: ICD-10-CM

## 2019-03-17 PROCEDURE — 96372 THER/PROPH/DIAG INJ SC/IM: CPT

## 2019-03-17 PROCEDURE — 99282 EMERGENCY DEPT VISIT SF MDM: CPT

## 2019-10-13 NOTE — EDM.PDOC
ED HPI GENERAL MEDICAL PROBLEM





- General


Chief Complaint: Respiratory Problem


Stated Complaint: BRONCHITIS


Time Seen by Provider: 10/13/19 22:15


Source of Information: Reports: Patient


History Limitations: Reports: No Limitations





- History of Present Illness


INITIAL COMMENTS - FREE TEXT/NARRATIVE: 





44-year-old female with a cough and short of breath for 12 hours. She is a 

smoker. No fevers or chills, some rhinitis. She had a sore throat yesterday. 


Onset: Gradual


Associated Symptoms: Reports: Cough, Shortness of Breath.  Denies: Fever/Chills


  ** denies


Pain Score (Numeric/FACES): 0





- Related Data


 Allergies











Allergy/AdvReac Type Severity Reaction Status Date / Time


 


Penicillins Allergy  Hives Verified 10/13/19 22:06











Home Meds: 


 Home Meds





Naproxen 375 mg PO ASDIRECTED PRN 17 [History]


QUEtiapine [SEROquel] 100 mg PO BEDTIME 17 [History]


tiZANidine [Zanaflex] 4 mg PO QID 17 [History]


Albuterol [Ventolin HFA] 2 puff INH Q4H PRN 10/13/19 [History]


Gabapentin [Neurontin] 800 mg PO QID 10/13/19 [History]


LORazepam 0.5 - 1 mg PO BID 10/13/19 [History]


Latanoprost 1 drop TOP DAILY 10/13/19 [History]


Lisdexamfetamine [Vyvanse] 70 mg PO DAILY 10/13/19 [History]


Lurasidone [Latuda] 60 mg PO DAILY 10/13/19 [History]











Past Medical History


HEENT History: Reports: Impaired Vision


Respiratory History: Reports: Asthma, Bronchitis, Recurrent, Pneumonia, 

Recurrent


Gastrointestinal History: Reports: Chronic Diarrhea, Irritable Bowel Syndrome


OB/GYN History: Reports: Pregnancy


Other OB/GYN History: states had "internal and external" ultrasound 2 months ago

, found thickening of uterine wall


Musculoskeletal History: Reports: Arthritis, Back Pain, Chronic, Fibromyalgia


Neurological History: Reports: Migraines


Psychiatric History: Reports: Anxiety, Bipolar, Depression, Psych 

Hospitalization(s), Suicide Attempt


Endocrine/Metabolic History: Reports: Obesity/BMI 30+





- Infectious Disease History


Infectious Disease History: Reports: Chicken Pox





- Past Surgical History


HEENT Surgical History: Reports: Oral Surgery


Female  Surgical History: Reports:  Section





Social & Family History





- Tobacco Use


Smoking Status *Q: Current Every Day Smoker


Years of Tobacco use: 30


Packs/Tins Daily: 1


Used Tobacco, but Quit: No


Second Hand Smoke Exposure: Yes





- Caffeine Use


Caffeine Use: Reports: Tea


Other Caffeine Use: minimal coffee, soda, and tea





- Recreational Drug Use


Recreational Drug Use: No





- Living Situation & Occupation


Living situation: Reports: Single


Occupation: Employed





ED ROS GENERAL





- Review of Systems


Review Of Systems: See Below


Constitutional: Reports: Malaise.  Denies: Fever, Chills


HEENT: Reports: Rhinitis, Throat Pain


Respiratory: Reports: Shortness of Breath, Wheezing, Cough


Cardiovascular: Reports: Chest Pain (The central chest hurts from coughing)


GI/Abdominal: Denies: Abdominal Pain, Nausea, Vomiting


Neurological: Reports: No Symptoms.  Denies: Headache





ED EXAM, GENERAL





- Physical Exam


Exam: See Below


Exam Limited By: No Limitations


General Appearance: Alert, No Apparent Distress


Throat/Mouth: Normal Inspection


Respiratory/Chest: No Respiratory Distress, Wheezing (Patient has diffuse 

expiratory wheezes, especially when coughing)


Cardiovascular: Regular Rate, Rhythm





Course





- Vital Signs


Last Recorded V/S: 


 Last Vital Signs











Temp  97.8 F   10/13/19 22:19


 


Pulse  82   10/13/19 22:19


 


Resp  16   10/13/19 22:19


 


BP  137/77   10/13/19 22:19


 


Pulse Ox  95   10/13/19 22:19














- Orders/Labs/Meds


Orders: 


 Active Orders 24 hr











 Category Date Time Status


 


 RT Aerosol Therapy [RC] ASDIRECTED Care  10/13/19 22:26 Active











Meds: 


Medications














Discontinued Medications














Generic Name Dose Route Start Last Admin





  Trade Name Jeevan  PRN Reason Stop Dose Admin


 


Albuterol/Ipratropium  3 ml  10/13/19 22:26  10/13/19 22:33





  Duoneb 3.0-0.5 Mg/3 Ml  NEB  10/13/19 22:27  3 ml





  ONETIME ONE   Administration





     





     





     





     














- Re-Assessments/Exams


Free Text/Narrative Re-Assessment/Exam: 





10/13/19 22:28


She was given a DuoNeb.


10/13/19 22:53


She had good improvement with the DuoNeb. Objectively she had markedly less 

wheezing, and subjectively she felt better. She will be placed on 60 mg of 

prednisone daily for 5 days. She has taken prednisone in the past and has 

tolerated it well.





Departure





- Departure


Time of Disposition: 23:01


Disposition: Home, Self-Care 01


Clinical Impression: 


 Acute viral bronchitis





Reactive airway disease


Qualifiers:


 Asthma severity: mild Asthma persistence: unspecified Qualified Code(s): 

J45.909 - Unspecified asthma, uncomplicated








- Discharge Information


Instructions:  Viral Respiratory Infection, Easy-To-Read


Referrals: 


Holden Cheema Sr, MD [Primary Care Provider] - 


Forms:  ED Department Discharge


Care Plan Goals: 


Take 6 pills of prednisone daily for 5 consecutive days, take the full dose 

daily with your first meal. Use inhalers on a regular basis if needed and 

recheck in 2-3 days if not improving. Try to reduce smoking.





- My Orders


Last 24 Hours: 


My Active Orders





10/13/19 22:26


RT Aerosol Therapy [RC] ASDIRECTED 














- Assessment/Plan


Last 24 Hours: 


My Active Orders





10/13/19 22:26


RT Aerosol Therapy [RC] ASDIRECTED

## 2019-10-14 NOTE — PCM.DCSUM1
**Discharge Summary





- Hospital Course


Free Text/Narrative:: 





Admitted after coming to the ER twice last night.  The second time she came in 

by EMS.  She was in respiratory distress.  She responded well to nebulizer and 

was admitted.  She gave a history of sudden  onset of shortness of breath.  


Diagnosis: Stroke: No


Modified [place] Scale: No Symptoms at All


Modified [place] Scale Score: 0





- Discharge Data


Discharge Date: 10/14/19


Discharge Disposition: Home, Self-Care 01


Condition: Stable





- Referral to Home Health


Primary Care Physician: 


Holden Cheema Sr, MD








- Patient Summary/Data


Hospital Course: 





After Admission when I saw her she gave a definite history of sudden onset of 

shortness of breath and pain in the calves of her legs.  At that time she was 

on 2 L of oxygen and nausea levels were just above 90% there was very minimal 

wheezing initially when I saw her in the morning.  CT of the lung was done with 

PE protocol which revealed no evidence of a pulmonary embolism.  Ultrasound of 

the legs did not reveal any DVT as well.  In afternoon rounds I had her walk 

around in the room.  While laying in bed her oxygen saturation was 90% during 

that time she was walking went up to 94% and she felt good.  She is being 

discharged home on the same medication that she was on including the prednisone 

that was started in the emergency room at her first visit.  She'll also start 

using an albuterol nebulizer as needed.  I encouraged her to stop smoking as 

this is part of the reason for her respiratory problem.  She does need medicine 

to help her stop smoking she will request them.  While in the hospital she was 

given a nicotine patch.  I will see her in the office in one week or sooner if 

needed.





- Patient Instructions


Diet: Heart Healthy Diet


Activity: As Tolerated





- Discharge Plan


*PRESCRIPTION DRUG MONITORING PROGRAM REVIEWED*: No


Home Medications: 


 Home Meds





Naproxen 375 mg PO ASDIRECTED PRN 12/16/17 [History]


QUEtiapine [SEROquel] 100 mg PO BEDTIME 12/16/17 [History]


tiZANidine [Zanaflex] 4 mg PO QID PRN 12/16/17 [History]


Albuterol [Ventolin HFA] 2 puff INH Q4H PRN 10/13/19 [History]


Gabapentin [Neurontin] 800 mg PO QID 10/13/19 [History]


LORazepam 0.5 - 1 mg PO BID PRN 10/13/19 [History]


Latanoprost 1 drop TOP DAILY 10/13/19 [History]


Lisdexamfetamine [Vyvanse] 70 mg PO DAILY 10/13/19 [History]


Lurasidone [Latuda] 60 mg PO DAILY 10/13/19 [History]


Albuterol [Proventil Neb Soln] 2.5 mg NEB Q2H PRN  neb 10/14/19 [Rx]








Patient Handouts:  How to Use a Nebulizer, Adult, Albuterol inhalation solution


Referrals: 


PCP,None [Ordering Only Provider] -  (Make appointment with Dr Cheema in 1 

week for follow up.)





- Discharge Summary/Plan Comment


DC Time >30 min.: No


Discharge Summary/Plan Comment: 





Assessment/plan:  #1.  Respiratory distress:  CT of the chest with PE Protocol 

was negative. and the Ultrasound of legs were neg for DVT.  There respiratory 

distress was due to bronchospasm. The x-ray showed ground glass deformities.  

Clinically she does not have an infection but bronchospasm.  I have ordered her 

to get a neb machine with Albuterol which she can use up to 4 times daily as 

needed.





#2.  Hypertension: She does have history of hypertension her blood pressure is 

good control at the present time.  Will continue with Metoprolol





#3.  Bipolar disorder. We'll continue with Quetiapine 





#4.  Fibromyalgia.  Continue with Tizanidine





#5.  Hyperlipidemia.  We'll continue with rosuvastatin.





I will see her in the office in one week or sooner if needed.





- General Info


Date of Service: 10/14/19


Functional Status: Reports: Pain Controlled





- Review of Systems


General: Reports: Weakness


HEENT: Reports: No Symptoms


Pulmonary: Reports: No Symptoms


Cardiovascular: Reports: No Symptoms


Gastrointestinal: Reports: No Symptoms


Genitourinary: Reports: No Symptoms


Musculoskeletal: Reports: No Symptoms


Skin: Reports: No Symptoms


Neurological: Reports: No Symptoms


Psychiatric: Reports: No Symptoms





- Patient Data


Vitals - Most Recent: 


 Last Vital Signs











Temp  96.9 F   10/14/19 13:10


 


Pulse  113 H  10/14/19 13:10


 


Resp  18   10/14/19 13:10


 


BP  148/83 H  10/14/19 13:10


 


Pulse Ox  92 L  10/14/19 13:10











Weight - Most Recent: 234 lb 6 oz


I&O - Last 24 hours: 


 Intake & Output











 10/14/19 10/14/19 10/14/19





 06:59 14:59 22:59


 


Intake Total 500 400 


 


Output Total 250  


 


Balance 250 400 











Lab Results - Last 24 hrs: 


 Laboratory Results - last 24 hr











  10/14/19 10/14/19 Range/Units





  01:33 01:33 


 


WBC  13.1 H   (4.5-11.0)  K/uL


 


RBC  4.42   (3.30-5.50)  M/uL


 


Hgb  13.3   (12.0-15.0)  g/dL


 


Hct  40.8   (36.0-48.0)  %


 


MCV  92   (80-98)  fL


 


MCH  30   (27-31)  pg


 


MCHC  33   (32-36)  %


 


Plt Count  250   (150-400)  K/uL


 


Neut % (Auto)  85 H   (36-66)  %


 


Lymph % (Auto)  7 L   (24-44)  %


 


Mono % (Auto)  5   (2-6)  %


 


Eos % (Auto)  3   (2-4)  %


 


Baso % (Auto)  0   (0-1)  %


 


Sodium   132 L  (140-148)  mmol/L


 


Potassium   3.7  (3.6-5.2)  mmol/L


 


Chloride   101  (100-108)  mmol/L


 


Carbon Dioxide   27  (21-32)  mmol/L


 


Anion Gap   3.6 L  (5.0-14.0)  mmol/L


 


BUN   10  (7-18)  mg/dL


 


Creatinine   0.8  (0.6-1.0)  mg/dL


 


Est Cr Clr Drug Dosing   83.59  mL/min


 


Estimated GFR (MDRD)   > 60  (>60)  


 


Glucose   115 H  ()  mg/dL


 


Calcium   9.2  (8.5-10.1)  mg/dL











Med Orders - Current: 


 Current Medications





Albuterol (Proventil Neb Soln)  2.5 mg NEB Q2H PRN


   PRN Reason: Shortness of Breath


   Last Admin: 10/14/19 12:49 Dose:  2.5 mg


Albuterol (Ventolin Hfa)  0 gm INH Q4H PRN


   PRN Reason: Wheezing


Doxycycline Hyclate (Vibramycin)  100 mg PO Q12H BRITTANY


Gabapentin (Neurontin)  800 mg PO QID BRITTANY


   Last Admin: 10/14/19 15:06 Dose:  800 mg


Ketorolac Tromethamine (Toradol)  30 mg IVPUSH Q6H PRN


   PRN Reason: Headache


   Stop: 10/19/19 12:12


   Last Admin: 10/14/19 12:30 Dose:  30 mg


Latanoprost (Xalatan 0.005% Ophth Soln)  0 ml EYEBOTH BEDTIME BRITTANY


Lorazepam (Ativan)  0.5 - 1 mg PO BID PRN


   PRN Reason: ANXIETY


   Last Admin: 10/14/19 13:08 Dose:  0.5 mg


Lurasidone HCl (Latuda)  60 mg PO DAILY BRITTANY


Nicotine (Habitrol)  21 mg TRDERM DAILY BRITTANY


   Last Admin: 10/14/19 15:43 Dose:  21 mg


Lisdexamfetamine [ Vyvanse] 70 Mg)-- Ptom  70 mg PO DAILY BRITTANY


Quetiapine Fumarate (Seroquel)  100 mg PO BEDTIME BRITTANY


Sodium Chloride (Saline Flush)  10 ml FLUSH ASDIRECTED PRN


   PRN Reason: Keep Vein Open


   Last Admin: 10/14/19 01:44 Dose:  10 ml


Tizanidine HCl (Zanaflex)  4 mg PO QID PRN


   PRN Reason: MUSCLE SPASM





Discontinued Medications





Doxycycline Hyclate (Vibramycin)  100 mg PO ONETIME ONE


   Stop: 10/14/19 03:01


   Last Admin: 10/14/19 03:18 Dose:  100 mg


Sodium Chloride (Normal Saline)  80 mls @ 4 mls/sec IV ONETIME ONE


   Stop: 10/14/19 10:14


   Last Admin: 10/14/19 11:41 Dose:  3.7 mls/sec


Iopamidol (Isovue-370 (76%))  100 ml IV .AS DIRECTED BRITTANY


   Stop: 10/14/19 10:16


   Last Admin: 10/14/19 11:43 Dose:  100 ml


Methylprednisolone Sodium Succinate (Solu-Medrol)  125 mg IVPUSH ONETIME ONE


   Stop: 10/14/19 01:23


   Last Admin: 10/14/19 01:42 Dose:  125 mg


Non-Formulary Medication (Naproxen [Naproxen])  375 mg PO ASDIRECTED PRN


   PRN Reason: Pain


Sodium Chloride (Saline Flush)  10 ml FLUSH ONETIME ONE


   Stop: 10/14/19 10:14


   Last Admin: 10/14/19 11:41 Dose:  10 ml


Sumatriptan Succinate (Imitrex)  6 mg SUBCUT ONETIME ONE


   Stop: 10/14/19 09:01


   Last Admin: 10/14/19 09:23 Dose:  6 mg


Tizanidine HCl (Zanaflex)  4 mg PO QID BRITTANY











- Exam


General: Reports: Alert, Oriented


HEENT: Reports: Pupils Equal, Pupils Reactive, EOMI, Mucous Membr. Moist/Pink


Neck: Reports: Supple


Lungs: Reports: Other (very mild rubs in her chest)


Cardiovascular: Reports: Regular Rate, Regular Rhythm


GI/Abdominal Exam: Normal Bowel Sounds, Soft, Non-Tender, No Organomegaly, No 

Distention, No Abnormal Bruit, No Mass, Pelvis Stable


Extremities: Normal Inspection, Normal Range of Motion, Non-Tender, No Pedal 

Edema, Normal Capillary Refill


Skin: Reports: Warm, Dry, Intact


Neurological: Reports: No New Focal Deficit


Psy/Mental Status: Reports: Alert, Normal Affect, Normal Mood

## 2019-10-14 NOTE — EDM.PDOC
ED HPI GENERAL MEDICAL PROBLEM





- General


Chief Complaint: Respiratory Problem


Stated Complaint: MEDICAL VIA NORTH


Time Seen by Provider: 10/14/19 01:05


Source of Information: Reports: Patient, EMS


History Limitations: Reports: No Limitations





- History of Present Illness


INITIAL COMMENTS - FREE TEXT/NARRATIVE: 





44-year-old female with 24 hours of reactive airways, asthma exacerbation and 

viral bronchitis who was in the emergency room earlier this evening and 

received a DuoNeb with good response. She did take 60 mg of prednisone but then 

became worse this morning, became anxious and more dyspneic and called the 

ambulance. On arrival she was again very wheezy and her O2 saturations were 88-

89%. She was given O2 and another DuoNeb in route and now is doing better again 

but O2 sats are still only 90-92% on room air.


Associated Symptoms: Reports: Other (Anxious).  Denies: Fever/Chills, Headaches


Treatments PTA: Reports: Other (see below)


Other Treatments PTA: Albuteral neb


  ** Lower Back


Pain Score (Numeric/FACES): 4





- Related Data


 Allergies











Allergy/AdvReac Type Severity Reaction Status Date / Time


 


Penicillins Allergy  Hives Verified 10/14/19 00:53











Home Meds: 


 Home Meds





Naproxen 375 mg PO ASDIRECTED PRN 17 [History]


QUEtiapine [SEROquel] 100 mg PO BEDTIME 17 [History]


tiZANidine [Zanaflex] 4 mg PO QID PRN 17 [History]


Albuterol [Ventolin HFA] 2 puff INH Q4H PRN 10/13/19 [History]


Gabapentin [Neurontin] 800 mg PO QID 10/13/19 [History]


LORazepam 0.5 - 1 mg PO BID PRN 10/13/19 [History]


Latanoprost 1 drop TOP DAILY 10/13/19 [History]


Lisdexamfetamine [Vyvanse] 70 mg PO DAILY 10/13/19 [History]


Lurasidone [Latuda] 60 mg PO DAILY 10/13/19 [History]


Albuterol [Proventil Neb Soln] 2.5 mg NEB Q2H PRN  neb 10/14/19 [Rx]











Past Medical History


HEENT History: Reports: Impaired Vision


Respiratory History: Reports: Asthma, Bronchitis, Recurrent, Pneumonia, 

Recurrent


Gastrointestinal History: Reports: Chronic Diarrhea, Irritable Bowel Syndrome


OB/GYN History: Reports: Pregnancy


Other OB/GYN History: states had "internal and external" ultrasound 2 months ago

, found thickening of uterine wall


Musculoskeletal History: Reports: Arthritis, Back Pain, Chronic, Fibromyalgia


Neurological History: Reports: Migraines


Psychiatric History: Reports: Anxiety, Bipolar, Depression, Psych 

Hospitalization(s), Suicide Attempt


Endocrine/Metabolic History: Reports: Obesity/BMI 30+





- Infectious Disease History


Infectious Disease History: Reports: Chicken Pox





- Past Surgical History


HEENT Surgical History: Reports: Oral Surgery


Female  Surgical History: Reports:  Section





Social & Family History





- Tobacco Use


Smoking Status *Q: Current Every Day Smoker


Years of Tobacco use: 30


Packs/Tins Daily: 0.5


Used Tobacco, but Quit: No


Second Hand Smoke Exposure: Yes





- Caffeine Use


Caffeine Use: Reports: Tea


Other Caffeine Use: minimal coffee, soda, and tea





- Recreational Drug Use


Recreational Drug Use: No





- Living Situation & Occupation


Living situation: Reports: Single


Occupation: Employed





ED ROS GENERAL





- Review of Systems


Review Of Systems: See Below


Constitutional: Reports: Malaise.  Denies: Fever, Chills


HEENT: Reports: Throat Pain (Sore throat yesterday)


Respiratory: Reports: Shortness of Breath, Wheezing, Cough.  Denies: Sputum


Cardiovascular: Reports: Chest Pain


GI/Abdominal: Denies: Abdominal Pain


Skin: Reports: Rash (Has a rash on her forehead and face)





ED EXAM, GENERAL





- Physical Exam


Exam: See Below


Exam Limited By: No Limitations


General Appearance: Alert, No Apparent Distress, Other (Feeling better after 

nebulization)


Eye Exam: Bilateral Eye: Normal Inspection


Head: Atraumatic


Respiratory/Chest: No Respiratory Distress, Wheezing (Some expiratory wheezes 

are still present diffusely)


Cardiovascular: Regular Rate, Rhythm.  No: Tachycardia (Lock in her)


Extremities: Normal Inspection.  No: Pedal Edema


Neurological: Alert, Oriented


Psychiatric: Normal Affect, Anxious


Skin Exam: Warm, Dry





Course





- Vital Signs


Last Recorded V/S: 


 Last Vital Signs











Temp  96.9 F   10/14/19 13:10


 


Pulse  113 H  10/14/19 13:10


 


Resp  18   10/14/19 13:10


 


BP  148/83 H  10/14/19 13:10


 


Pulse Ox  92 L  10/14/19 13:10














- Orders/Labs/Meds


Labs: 


 Laboratory Tests











  10/14/19 10/14/19 Range/Units





  01:33 01:33 


 


WBC  13.1 H   (4.5-11.0)  K/uL


 


RBC  4.42   (3.30-5.50)  M/uL


 


Hgb  13.3   (12.0-15.0)  g/dL


 


Hct  40.8   (36.0-48.0)  %


 


MCV  92   (80-98)  fL


 


MCH  30   (27-31)  pg


 


MCHC  33   (32-36)  %


 


Plt Count  250   (150-400)  K/uL


 


Neut % (Auto)  85 H   (36-66)  %


 


Lymph % (Auto)  7 L   (24-44)  %


 


Mono % (Auto)  5   (2-6)  %


 


Eos % (Auto)  3   (2-4)  %


 


Baso % (Auto)  0   (0-1)  %


 


Sodium   132 L  (140-148)  mmol/L


 


Potassium   3.7  (3.6-5.2)  mmol/L


 


Chloride   101  (100-108)  mmol/L


 


Carbon Dioxide   27  (21-32)  mmol/L


 


Anion Gap   3.6 L  (5.0-14.0)  mmol/L


 


BUN   10  (7-18)  mg/dL


 


Creatinine   0.8  (0.6-1.0)  mg/dL


 


Est Cr Clr Drug Dosing   83.59  mL/min


 


Estimated GFR (MDRD)   > 60  (>60)  


 


Glucose   115 H  ()  mg/dL


 


Calcium   9.2  (8.5-10.1)  mg/dL











Meds: 


Medications














Discontinued Medications














Generic Name Dose Route Start Last Admin





  Trade Name Freq  PRN Reason Stop Dose Admin


 


Albuterol  2.5 mg  10/14/19 02:33  10/14/19 12:49





  Proventil Neb Soln  NEB   2.5 mg





  Q2H PRN   Administration





  Shortness of Breath   





     





     





     


 


Albuterol  0 gm  10/14/19 12:00  





  Ventolin Hfa  INH   





  Q4H PRN   





  Wheezing   





     





     





     


 


Doxycycline Hyclate  100 mg  10/14/19 18:00  





  Vibramycin  PO   





  Q12H BRITTANY   





     





     





     





     


 


Doxycycline Hyclate  100 mg  10/14/19 03:00  10/14/19 03:18





  Vibramycin  PO  10/14/19 03:01  100 mg





  ONETIME ONE   Administration





     





     





     





     


 


Gabapentin  800 mg  10/14/19 16:00  10/14/19 15:06





  Neurontin  PO   800 mg





  QID BRITTANY   Administration





     





     





     





     


 


Sodium Chloride  80 mls @ 4 mls/sec  10/14/19 10:13  10/14/19 11:41





  Normal Saline  IV  10/14/19 10:14  3.7 mls/sec





  ONETIME ONE   Administration





     





     





     





     


 


Iopamidol  100 ml  10/14/19 10:15  10/14/19 11:43





  Isovue-370 (76%)  IV  10/14/19 10:16  100 ml





  .AS DIRECTED BRITTANY   Administration





     





     





     





     


 


Ketorolac Tromethamine  30 mg  10/14/19 12:12  10/14/19 12:30





  Toradol  IVPUSH  10/19/19 12:12  30 mg





  Q6H PRN   Administration





  Headache   





     





     





     


 


Latanoprost  0 ml  10/15/19 21:00  





  Xalatan 0.005% Ophth Soln  EYEBOTH   





  BEDTIME BRITTANY   





     





     





     





     


 


Lorazepam  0.5 - 1 mg  10/14/19 12:58  10/14/19 13:08





  Ativan  PO   0.5 mg





  BID PRN   Administration





  ANXIETY   





     





     





     


 


Lurasidone HCl  60 mg  10/15/19 09:00  





  Latuda  PO   





  DAILY BRITTANY   





     





     





     





     


 


Methylprednisolone Sodium Succinate  125 mg  10/14/19 01:22  10/14/19 01:42





  Solu-Medrol  IVPUSH  10/14/19 01:23  125 mg





  ONETIME ONE   Administration





     





     





     





     


 


Nicotine  21 mg  10/14/19 15:30  10/14/19 15:43





  Habitrol  TRDERM   21 mg





  DAILY BRITTANY   Administration





     





     





     





     


 


Lisdexamfetamine [  70 mg  10/15/19 09:00  





Vyvanse] 70 Mg)--  PO   





Ptom  DAILY BRITTANY   





     





     





     





     


 


Non-Formulary Medication  375 mg  10/14/19 12:00  





  Naproxen [Naproxen]  PO   





  ASDIRECTED PRN   





  Pain   





     





     





     


 


Quetiapine Fumarate  100 mg  10/14/19 21:00  





  Seroquel  PO   





  BEDTIME BRITTANY   





     





     





     





     


 


Sodium Chloride  10 ml  10/14/19 01:22  10/14/19 01:44





  Saline Flush  FLUSH   10 ml





  ASDIRECTED PRN   Administration





  Keep Vein Open   





     





     





     


 


Sodium Chloride  10 ml  10/14/19 10:13  10/14/19 11:41





  Saline Flush  FLUSH  10/14/19 10:14  10 ml





  ONETIME ONE   Administration





     





     





     





     


 


Sumatriptan Succinate  6 mg  10/14/19 09:00  10/14/19 09:23





  Imitrex  SUBCUT  10/14/19 09:01  6 mg





  ONETIME ONE   Administration





     





     





     





     


 


Tizanidine HCl  4 mg  10/14/19 16:00  





  Zanaflex  PO   





  QID BRITTANY   





     





     





     





     


 


Tizanidine HCl  4 mg  10/14/19 12:20  





  Zanaflex  PO   





  QID PRN   





  MUSCLE SPASM   





     





     





     














- Re-Assessments/Exams


Free Text/Narrative Re-Assessment/Exam: 





10/14/19 01:21


Discuss her condition with Dr. Holden Cheema, after a two-view chest x-ray a 

saline lock will be placed and she'll be given 125 mg of IV Solu-Medrol. CBC 

and BMP will be obtained and she'll be admitted overnight for asthma 

exacerbation failing outpatient treatment.


10/14/19 01:55


White count 13.1, two-view chest x-ray is normal.





Departure





- Departure


Time of Disposition: 02:21


Disposition: Admitted As Inpatient 65


Clinical Impression: 


 Acute viral bronchitis





Exacerbation of asthma


Qualifiers:


 Asthma severity: moderate Asthma persistence: persistent Qualified Code(s): 

J45.41 - Moderate persistent asthma with (acute) exacerbation








- Discharge Information

## 2019-10-14 NOTE — US
VL Duplex Lwr Ext Veins Comp

 

HISTORY: No Clinical Info

 

FINDINGS: The deep veins of the lower extremities bilaterally demonstrate normal

augmentation and compressibility. No evidence for deep venous thrombosis. 

 

IMPRESSION: Normal bilateral lower extremity venous Doppler ultrasound.

## 2019-10-14 NOTE — CT
Ang Chest

 

CLINICAL HISTORY: Back pain 

 

TECHNIQUE: Thin section axial contiguous tomographic sections were taken through

the chest after bolus IV iodinated contrast administration. Coronal and sagittal

images were reconstructed. Auto dosage reduction and iterative reconstruction

techniques employed.

 

FINDINGS: There is some signal lies due to patient's large body habitus. Patient

has scattered patchy groundglass opacifications throughout both lung fields.

This is significantly reduced from prior study dating back to 2013 and 2010. No

pulmonary mass is identified. There is no mediastinal mass or suspicious

lymphadenopathy. There are some small hilar nodes bilaterally. These are

diminished since prior study. The heart is enlarged.

There is less than optimal opacification of the pulmonary arteries. No filling

defects or vessel cut off is identified.. The aorta is free of aneurysm or

dissection

 

IMPRESSION: Scattered patchy groundglass opacifications throughout both lung

fields. This is in part chronic and has significantly diminished from CT studies

of 2013 and 2010. This may represent the recurrent the pneumonitis. Atypical

infection is a consideration.

 

No evidence of pulmonary embolus or aortic dissection

 

Mild bilateral hilar adenopathy also reduced when compared to prior study

 

Cardio megaly

## 2019-10-14 NOTE — CRLCR
INDICATION: Dyspnea



TECHNIQUE: Chest radiograph 2 views



COMPARISON: 10/19/2017, 05/04/2016



FINDINGS: Moderate degradation of image quality noted due to body habitus. 



Mediastinum: The mediastinum is normal in appearance. The heart silhouette 

is normal in size and morphology. 



Lung: Both lungs are unremarkable in appearance. No sign of pleural 

effusion seen. No pneumothorax is identified. 



Bone and Soft tissue: Unremarkable for age. 



IMPRESSION: 



1. No acute cardiopulmonary disease is seen. 



Dictated by: Vincenzo Avila MD @ 10/14/2019 01:54:23



(Electronically Signed)

## 2019-10-14 NOTE — PCM.HP.2
H&P History of Present Illness





- General


Date of Service: 10/14/19


Admit Problem/Dx: 


 Admission Diagnosis/Problem





Admission Diagnosis/Problem      Asthma with bronchitis








Source of Information: Patient





- History of Present Illness


Initial Comments - Free Text/Narative: 





Karime comes in for evaluation after having a sudden onset of shortness of breath 

yesterday.  She said her mother has had a cough and then she developed a sore 

throat.  She finally came in by ambulance and was admitted early this morning 

as she was in respiratory distress.  While in emergency room and he was given 

steroids and nebulizer and improved some but still oxygen level was low.  She 

also suffers from bipolar disorder and fibromyalgia and hypertension.


Onset of Symptoms: Reports: Sudden


Duration of Symptoms: Reports: Day(s):


Location: Reports: Chest


Associated Symptoms: Reports: Shortness of Breath, Weakness


  ** Lower Back


Pain Score (Numeric/FACES): 4





- Related Data


Allergies/Adverse Reactions: 


 Allergies











Allergy/AdvReac Type Severity Reaction Status Date / Time


 


Penicillins Allergy  Hives Verified 10/14/19 00:53











Home Medications: 


 Home Meds





Naproxen 375 mg PO ASDIRECTED PRN 17 [History]


QUEtiapine [SEROquel] 100 mg PO BEDTIME 17 [History]


tiZANidine [Zanaflex] 4 mg PO QID PRN 17 [History]


Albuterol [Ventolin HFA] 2 puff INH Q4H PRN 10/13/19 [History]


Gabapentin [Neurontin] 800 mg PO QID 10/13/19 [History]


LORazepam 0.5 - 1 mg PO BID PRN 10/13/19 [History]


Latanoprost 1 drop TOP DAILY 10/13/19 [History]


Lisdexamfetamine [Vyvanse] 70 mg PO DAILY 10/13/19 [History]


Lurasidone [Latuda] 60 mg PO DAILY 10/13/19 [History]


Albuterol [Proventil Neb Soln] 2.5 mg NEB Q2H PRN  neb 10/14/19 [Rx]











Past Medical History


HEENT History: Reports: Impaired Vision


Respiratory History: Reports: Asthma, Bronchitis, Recurrent, Pneumonia, 

Recurrent


Gastrointestinal History: Reports: Chronic Diarrhea, Irritable Bowel Syndrome


OB/GYN History: Reports: Pregnancy


Other OB/BYN History: states had "internal and external" ultrasound 2 months ago

, found thickening of uterine wall


Musculoskeletal History: Reports: Arthritis, Back Pain, Chronic, Fibromyalgia


Neurological History: Reports: Migraines


Psychiatric History: Reports: Anxiety, Bipolar, Depression, Psych 

Hospitalization(s), Suicide Attempt


Endocrine/Metabolic History: Reports: Obesity/BMI 30+





- Infectious Disease History


Infectious Disease History: Reports: Chicken Pox





- Past Surgical History


HEENT Surgical History: Reports: Oral Surgery


Female  Surgical History: Reports:  Section





Social & Family History





- Tobacco Use


Smoking Status *Q: Current Every Day Smoker


Years of Tobacco use: 30


Packs/Tins Daily: 0.5


Used Tobacco, but Quit: No


Second Hand Smoke Exposure: Yes





- Caffeine Use


Caffeine Use: Reports: Tea


Other Caffeine Use: minimal coffee, soda, and tea





- Recreational Drug Use


Recreational Drug Use: No





- Living Situation & Occupation


Living situation: Reports: Single


Occupation: Employed





H&P Review of Systems





- Review of Systems:


Review Of Systems: See Below


General: Reports: Weakness


HEENT: Reports: Sore Throat


Pulmonary: Reports: Shortness of Breath, Wheezing


Cardiovascular: Reports: No Symptoms


Gastrointestinal: Reports: No Symptoms


Genitourinary: Reports: No Symptoms


Musculoskeletal: Reports: Leg Pain


Skin: Reports: No Symptoms


Psychiatric: Reports: Depression


Neurological: Reports: Weakness


Hematologic/Lymphatic: Reports: No Symptoms


Immunologic: Reports: No Symptoms





Exam





- Exam


Exam: See Below





- Vital Signs


Vital Signs: 


 Last Vital Signs











Temp  97.4 F   10/14/19 07:45


 


Pulse  73   10/14/19 07:45


 


Resp  16   10/14/19 07:45


 


BP  115/56 L  10/14/19 07:45


 


Pulse Ox  90 L  10/14/19 07:45











Weight: 234 lb 6 oz





- Exam


General: Alert, Oriented, 4


HEENT: PERRLA, Hearing Intact, Mucosa Moist & Pink, Nares Patent, Normal Nasal 

Septum, Posterior Pharynx Clear, Conjunctiva Clear, EOMI, EACs Clear, TMs Clear


Neck: Supple, Trachea Midline, 2


Lungs: Decreased Breath Sounds


Cardiovascular: Regular Rate, Regular Rhythm


GI/Abdominal Exam: Normal Bowel Sounds, Soft, Non-Tender, No Organomegaly, No 

Distention, No Abnormal Bruit, No Mass, Pelvis Stable


Extremities: Other (There is mild pain in the left leg.)


Peripheral Pulses: 1+: Radial (L), Radial (R)


Skin: Warm, Dry, Intact


Neurological: Cranial Nerves Intact


Neuro Extensive - Mental Status: Alert, Oriented x3, Normal Mood/Affect


Neuro Extensive - Motor, Sensory, Reflexes: CN II-XII Intact


DTR: 1+: Bicep (L), Bicep (R)


Psychiatric: Alert, Normal Affect, Normal Mood





- Patient Data


Lab Results Last 24 hrs: 


 Laboratory Results - last 24 hr











  10/14/19 10/14/19 Range/Units





  01:33 01:33 


 


WBC  13.1 H   (4.5-11.0)  K/uL


 


RBC  4.42   (3.30-5.50)  M/uL


 


Hgb  13.3   (12.0-15.0)  g/dL


 


Hct  40.8   (36.0-48.0)  %


 


MCV  92   (80-98)  fL


 


MCH  30   (27-31)  pg


 


MCHC  33   (32-36)  %


 


Plt Count  250   (150-400)  K/uL


 


Neut % (Auto)  85 H   (36-66)  %


 


Lymph % (Auto)  7 L   (24-44)  %


 


Mono % (Auto)  5   (2-6)  %


 


Eos % (Auto)  3   (2-4)  %


 


Baso % (Auto)  0   (0-1)  %


 


Sodium   132 L  (140-148)  mmol/L


 


Potassium   3.7  (3.6-5.2)  mmol/L


 


Chloride   101  (100-108)  mmol/L


 


Carbon Dioxide   27  (21-32)  mmol/L


 


Anion Gap   3.6 L  (5.0-14.0)  mmol/L


 


BUN   10  (7-18)  mg/dL


 


Creatinine   0.8  (0.6-1.0)  mg/dL


 


Est Cr Clr Drug Dosing   83.59  mL/min


 


Estimated GFR (MDRD)   > 60  (>60)  


 


Glucose   115 H  ()  mg/dL


 


Calcium   9.2  (8.5-10.1)  mg/dL











Result Diagrams: 


 10/14/19 01:33





 10/14/19 01:33


Problem List Initiated/Reviewed/Updated: Yes


Orders Last 24hrs: 


 Active Orders 24 hr











 Category Date Time Status


 


 Admission Status [Patient Status] [ADT] Routine ADT  10/14/19 02:00 Active


 


 Oxygen Therapy [RC] ASDIRECTED Care  10/14/19 02:33 Active


 


 RT Aerosol Therapy [RC] ASDIRECTED Care  10/14/19 02:33 Active


 


 Vital Signs [RC] Q4H Care  10/14/19 02:34 Active


 


 Regular Diet [DIET] Diet  10/14/19 Breakfast Active


 


 Ang Chest [CT] Routine Exams  10/14/19 10:03 Ordered


 


 VL Duplex Lwr Ext Veins Comp [US] Routine Exams  10/14/19 10:07 Ordered


 


 Albuterol [Proventil Neb Soln] Med  10/14/19 02:33 Active





 2.5 mg NEB Q2H PRN   


 


 Doxycycline [Vibramycin] Med  10/14/19 18:00 Active





 100 mg PO Q12H   


 


 Sodium Chloride 0.9% [Saline Flush] Med  10/14/19 01:22 Active





 10 ml FLUSH ASDIRECTED PRN   


 


 Saline Lock Insert [OM.PC] Routine Oth  10/14/19 01:22 Ordered


 


 Sequential Compression Device [OM.PC] Routine Oth  10/14/19 02:36 Ordered


 


 Code Status [Resuscitation Status] Routine Resus Stat  10/14/19 02:36 Ordered








 Medication Orders





Albuterol (Proventil Neb Soln)  2.5 mg NEB Q2H PRN


   PRN Reason: Shortness of Breath


   Last Admin: 10/14/19 08:45  Dose: 2.5 mg


   Admin: 10/14/19 05:43  Dose: 2.5 mg


Doxycycline Hyclate (Vibramycin)  100 mg PO Q12H BRITTANY


Sodium Chloride (Saline Flush)  10 ml FLUSH ASDIRECTED PRN


   PRN Reason: Keep Vein Open


   Last Admin: 10/14/19 01:44  Dose: 10 ml








Assessment/Plan Comment:: 


Assessment/plan:  #1.  Respiratory distress:  The onset needs one suspicious 

that it could be a pulmonary embolism.  I've ordered a CT of the lung with PE 

protocol and also a Venous Doppler flow study of the lower extremities.  Her 

oxygen level is 90 on 2 L this is of concern when she hasn't had the problem 

before.





#2.  Hypertension: She does have history of hypertension her blood pressure is 

good control at the present time.  Will continue with Metoprolol





#3.  Bipolar disorder. We'll continue with Quetiapine 





#4.  Fibromyalgia.  Continue with Tizanidine





#5.  Hyperlipidemia.  We'll continue with rosuvastatin.

## 2019-10-14 NOTE — PCM.PN
- General Info


Date of Service: 10/14/19


Functional Status: Reports: Pain Controlled





- Review of Systems


General: Reports: Weakness


HEENT: Reports: No Symptoms


Pulmonary: Reports: Shortness of Breath, Cough, Wheezing


Cardiovascular: Reports: Dyspnea on Exertion


Gastrointestinal: Reports: No Symptoms


Genitourinary: Reports: No Symptoms


Musculoskeletal: Reports: No Symptoms


Skin: Reports: No Symptoms


Neurological: Reports: No Symptoms


Psychiatric: Reports: No Symptoms





- Patient Data


Vitals - Most Recent: 


 Last Vital Signs











Temp  96.9 F   10/14/19 13:10


 


Pulse  113 H  10/14/19 13:10


 


Resp  18   10/14/19 13:10


 


BP  148/83 H  10/14/19 13:10


 


Pulse Ox  92 L  10/14/19 13:10











Weight - Most Recent: 234 lb 6 oz


I&O - Last 24 Hours: 


 Intake & Output











 10/14/19 10/14/19 10/14/19





 06:59 14:59 22:59


 


Intake Total 500 400 


 


Output Total 250  


 


Balance 250 400 











Lab Results Last 24 Hours: 


 Laboratory Results - last 24 hr











  10/14/19 10/14/19 Range/Units





  01:33 01:33 


 


WBC  13.1 H   (4.5-11.0)  K/uL


 


RBC  4.42   (3.30-5.50)  M/uL


 


Hgb  13.3   (12.0-15.0)  g/dL


 


Hct  40.8   (36.0-48.0)  %


 


MCV  92   (80-98)  fL


 


MCH  30   (27-31)  pg


 


MCHC  33   (32-36)  %


 


Plt Count  250   (150-400)  K/uL


 


Neut % (Auto)  85 H   (36-66)  %


 


Lymph % (Auto)  7 L   (24-44)  %


 


Mono % (Auto)  5   (2-6)  %


 


Eos % (Auto)  3   (2-4)  %


 


Baso % (Auto)  0   (0-1)  %


 


Sodium   132 L  (140-148)  mmol/L


 


Potassium   3.7  (3.6-5.2)  mmol/L


 


Chloride   101  (100-108)  mmol/L


 


Carbon Dioxide   27  (21-32)  mmol/L


 


Anion Gap   3.6 L  (5.0-14.0)  mmol/L


 


BUN   10  (7-18)  mg/dL


 


Creatinine   0.8  (0.6-1.0)  mg/dL


 


Est Cr Clr Drug Dosing   83.59  mL/min


 


Estimated GFR (MDRD)   > 60  (>60)  


 


Glucose   115 H  ()  mg/dL


 


Calcium   9.2  (8.5-10.1)  mg/dL











Med Orders - Current: 


 Current Medications





Albuterol (Proventil Neb Soln)  2.5 mg NEB Q2H PRN


   PRN Reason: Shortness of Breath


   Last Admin: 10/14/19 12:49 Dose:  2.5 mg


Albuterol (Ventolin Hfa)  0 gm INH Q4H PRN


   PRN Reason: Wheezing


Doxycycline Hyclate (Vibramycin)  100 mg PO Q12H BRITTANY


Gabapentin (Neurontin)  800 mg PO QID BRITTANY


   Last Admin: 10/14/19 15:06 Dose:  800 mg


Ketorolac Tromethamine (Toradol)  30 mg IVPUSH Q6H PRN


   PRN Reason: Headache


   Stop: 10/19/19 12:12


   Last Admin: 10/14/19 12:30 Dose:  30 mg


Latanoprost (Xalatan 0.005% Ophth Soln)  0 ml EYEBOTH BEDTIME BRITTANY


Lorazepam (Ativan)  0.5 - 1 mg PO BID PRN


   PRN Reason: ANXIETY


   Last Admin: 10/14/19 13:08 Dose:  0.5 mg


Lurasidone HCl (Latuda)  60 mg PO DAILY [facility]


Nicotine (Habitrol)  21 mg TRDERM DAILY [facility]


   Last Admin: 10/14/19 15:43 Dose:  21 mg


Lisdexamfetamine [ Vyvanse] 70 Mg)-- Ptom  70 mg PO DAILY [facility]


Quetiapine Fumarate (Seroquel)  100 mg PO BEDTIME [facility]


Sodium Chloride (Saline Flush)  10 ml FLUSH ASDIRECTED PRN


   PRN Reason: Keep Vein Open


   Last Admin: 10/14/19 01:44 Dose:  10 ml


Tizanidine HCl (Zanaflex)  4 mg PO QID PRN


   PRN Reason: MUSCLE SPASM





Discontinued Medications





Doxycycline Hyclate (Vibramycin)  100 mg PO ONETIME ONE


   Stop: 10/14/19 03:01


   Last Admin: 10/14/19 03:18 Dose:  100 mg


Sodium Chloride (Normal Saline)  80 mls @ 4 mls/sec IV ONETIME ONE


   Stop: 10/14/19 10:14


   Last Admin: 10/14/19 11:41 Dose:  3.7 mls/sec


Iopamidol (Isovue-370 (76%))  100 ml IV .AS DIRECTED BRITTANY


   Stop: 10/14/19 10:16


   Last Admin: 10/14/19 11:43 Dose:  100 ml


Methylprednisolone Sodium Succinate (Solu-Medrol)  125 mg IVPUSH ONETIME ONE


   Stop: 10/14/19 01:23


   Last Admin: 10/14/19 01:42 Dose:  125 mg


Non-Formulary Medication (Naproxen [Naproxen])  375 mg PO ASDIRECTED PRN


   PRN Reason: Pain


Sodium Chloride (Saline Flush)  10 ml FLUSH ONETIME ONE


   Stop: 10/14/19 10:14


   Last Admin: 10/14/19 11:41 Dose:  10 ml


Sumatriptan Succinate (Imitrex)  6 mg SUBCUT ONETIME ONE


   Stop: 10/14/19 09:01


   Last Admin: 10/14/19 09:23 Dose:  6 mg


Tizanidine HCl (Zanaflex)  4 mg PO QID BRITTANY











- Exam


General: Alert, Oriented


HEENT: Pupils Equal, Pupils Reactive, EOMI, Mucous Membr. Moist/Pink


Neck: Supple


Lungs: Decreased Breath Sounds


Cardiovascular: Regular Rate, Regular Rhythm


GI/Abdominal Exam: Normal Bowel Sounds, Soft, Non-Tender, No Organomegaly, No 

Distention, No Abnormal Bruit, No Mass, Pelvis Stable


Peripheral Pulses: 1+: Brachial (L), Brachial (R)


Skin: Warm, Dry, Intact


Psy/Mental Status: Alert, Normal Affect, Normal Mood





- Problem List Review


Problem List Initiated/Reviewed/Updated: Yes





- My Orders


Last 24 Hours: 


My Active Orders





10/14/19 02:33


Oxygen Therapy [RC] ASDIRECTED 


RT Aerosol Therapy [RC] ASDIRECTED 


Albuterol [Proventil Neb Soln]   2.5 mg NEB Q2H PRN 





10/14/19 02:34


Vital Signs [RC] Q4H 





10/14/19 02:36


Sequential Compression Device [OM.PC] Routine 


Code Status [Resuscitation Status] Routine 





10/14/19 12:00


Albuterol [Ventolin HFA]   0 gm INH Q4H PRN 





10/14/19 12:12


Ketorolac [Toradol]   30 mg IVPUSH Q6H PRN 





10/14/19 12:20


tiZANidine [Zanaflex]   4 mg PO QID PRN 





10/14/19 12:58


LORazepam [Ativan]   0.5 - 1 mg PO BID PRN 





10/14/19 15:30


Nicotine [Habitrol]   21 mg TRDERM DAILY 





10/14/19 16:00


Gabapentin [Neurontin]   800 mg PO QID 





10/14/19 17:13


Ready for Discharge [RC] PER UNIT ROUTINE 





10/14/19 18:00


Doxycycline [Vibramycin]   100 mg PO Q12H 





10/14/19 21:00


QUEtiapine [SEROquel]   100 mg PO BEDTIME 





10/14/19 Breakfast


Regular Diet [DIET] 





10/15/19 09:00


Lisdexamfetamine [Vyvanse]   70 mg PO DAILY 


Lurasidone [Latuda]   60 mg PO DAILY 





10/15/19 21:00


Latanoprost [Xalatan 0.005% Ophth Soln]   0 ml EYEBOTH BEDTIME 














- Plan


Plan:: 


Assessment/plan:  #1.  Respiratory distress:  CT of the chest with PE Protocol 

was negative. and the Ultrasound of legs were neg for DVT.  There respiratory 

distress was due to bronchospasm. The x-ray showed ground glass deformities.  

Clinically she does not have an infection but bronchospasm.  I have ordered her 

to get a neb machine with Albuterol which she can use up to 4 times daily as 

needed.





#2.  Hypertension: She does have history of hypertension her blood pressure is 

good control at the present time.  Will continue with Metoprolol





#3.  Bipolar disorder. We'll continue with Quetiapine 





#4.  Fibromyalgia.  Continue with Tizanidine





#5.  Hyperlipidemia.  We'll continue with rosuvastatin.





I will see her in the office in one week or sooner if needed.

## 2021-03-04 NOTE — EDM.PDOC
<Areli Saldivar M - Last Filed: 19 14:45>





ED HPI GENERAL MEDICAL PROBLEM





- General


Chief Complaint: General


Stated Complaint: INFECTION THROUGHOUT HER BODY


Time Seen by Provider: 19 14:10


Source of Information: Reports: Patient


History Limitations: Reports: No Limitations





- History of Present Illness


Onset: Gradual


Location: Reports: Face (left jawline - after dental work done on 3/7/19), 

Radiates to (back and hips)


Context: Reports: Activity


Associated Symptoms: Reports: No Other Symptoms


Treatments PTA: Reports: Other (see below) (saline rinses as ordered by dentist)





- Related Data


 Allergies











Allergy/AdvReac Type Severity Reaction Status Date / Time


 


Penicillins Allergy  Hives Verified 19 13:33











Home Meds: 


 Home Meds





Gabapentin [Neurontin] 300 mg PO BID  cap 10/21/17 [Rx]


Naproxen 375 mg PO ASDIRECTED PRN 17 [History]


QUEtiapine [SEROquel] 200 mg PO BEDTIME 17 [History]


tiZANidine [Zanaflex] 4 mg PO BID 17 [History]


Clindamycin HCl 300 mg PO TID 19 [History]


PARoxetine HCl [Paxil] 40 mg PO BEDTIME 19 [History]











Past Medical History


HEENT History: Reports: Impaired Vision


Respiratory History: Reports: Asthma, Bronchitis, Recurrent, Pneumonia, 

Recurrent


Gastrointestinal History: Reports: Chronic Diarrhea, Irritable Bowel Syndrome


OB/GYN History: Reports: Pregnancy


Other OB/GYN History: states had "internal and external" ultrasound 2 months ago

, found thickening of uterine wall


Musculoskeletal History: Reports: Arthritis, Back Pain, Chronic, Fibromyalgia


Neurological History: Reports: Migraines


Psychiatric History: Reports: Anxiety, Bipolar, Depression, Psych 

Hospitalization(s), Suicide Attempt


Endocrine/Metabolic History: Reports: Obesity/BMI 30+





- Infectious Disease History


Infectious Disease History: Reports: Chicken Pox





- Past Surgical History


HEENT Surgical History: Reports: Oral Surgery


Female  Surgical History: Reports:  Section





Social & Family History





- Tobacco Use


Smoking Status *Q: Current Every Day Smoker


Years of Tobacco use: 20


Packs/Tins Daily: 0.5





- Caffeine Use


Caffeine Use: Reports: Coffee, Soda


Other Caffeine Use: minimal coffee, soda, and tea





- Recreational Drug Use


Recreational Drug Use: No





- Living Situation & Occupation


Living situation: Reports: Single


Occupation: Employed





ED ROS GENERAL





- Review of Systems


Review Of Systems: ROS reveals no pertinent complaints other than HPI.


Constitutional: Reports: No Symptoms


HEENT: Reports: Dental Pain (left jaw pain. )


Respiratory: Reports: No Symptoms


Cardiovascular: Reports: No Symptoms


Endocrine: Reports: No Symptoms


Skin: Reports: No Symptoms


Psychiatric: Reports: No Symptoms





ED EXAM, GENERAL





- Physical Exam


Exam: See Below


Exam Limited By: No Limitations


General Appearance: Alert, WD/WN, No Apparent Distress


Throat/Mouth: Normal Inspection, Normal Lips, Normal Teeth, Normal Gums, Other (

no lesions or masses to buccal mucosa or inner and outer gingival areas )


Neck: Normal Inspection


Respiratory/Chest: No Respiratory Distress


Extremities: Normal Inspection, Normal Range of Motion (does complain of left 

hip pain described as a flare up of sciatica pain. Noted. Reassurance given. )





Course





- Vital Signs


Last Recorded V/S: 


 Last Vital Signs











Temp  98.5 F   19 13:36


 


Pulse  81   19 13:36


 


Resp  18   19 13:36


 


BP  136/76   19 13:36


 


Pulse Ox  96   19 13:36














- Orders/Labs/Meds


Meds: 


Medications














Discontinued Medications














Generic Name Dose Route Start Last Admin





  Trade Name Jeevan  PRN Reason Stop Dose Admin


 


Ketorolac Tromethamine  60 mg  19 14:33  19 14:39





  Toradol  IM  19 14:34  60 mg





  ONETIME ONE   Administration





     





     





     





     














Departure





- Departure


Disposition: Home, Self-Care 01


Condition: Good


Clinical Impression: 


 Pain, dental








- Discharge Information


*PRESCRIPTION DRUG MONITORING PROGRAM REVIEWED*: Not Applicable


*COPY OF PRESCRIPTION DRUG MONITORING REPORT IN PATIENT SHERI: Not Applicable


Instructions:  Preventive Dental Care, Adult


Referrals: 


Barbara Villagran MD [Primary Care Provider] - 


Forms:  ED Department Discharge


Care Plan Goals: 


Patient feels antibiotic is no longer working after four days with no total 

resolution of symptoms. Reassurace given that changing antiobiotic may help in 

symptom resolution and return to normal baseline. Patient and mother very eager 

for therapy. IM Toradol given for moderate pain. Instymed prescription given. 





<Alex Carranza - Last Filed: 19 08:38>





ED ROS GENERAL





- Review of Systems


Review Of Systems: See Below





Course





- Re-Assessments/Exams


Free Text/Narrative Re-Assessment/Exam: 





19 08:38


Patient history and physical exam notes by the nurse practitioner were reviewed 

and repeated. I concur with the findings and treatment plan.





Departure





- Departure


Time of Disposition: 14:59 Consent (Temporal Branch)/Introductory Paragraph: The rationale for Mohs was explained to the patient and consent was obtained. The risks, benefits and alternatives to therapy were discussed in detail. Specifically, the risks of damage to the temporal branch of the facial nerve, infection, scarring, bleeding, prolonged wound healing, incomplete removal, allergy to anesthesia, and recurrence were addressed. Prior to the procedure, the treatment site was clearly identified and confirmed by the patient. All components of Universal Protocol/PAUSE Rule completed.